# Patient Record
Sex: MALE | Race: WHITE | NOT HISPANIC OR LATINO | Employment: FULL TIME | URBAN - METROPOLITAN AREA
[De-identification: names, ages, dates, MRNs, and addresses within clinical notes are randomized per-mention and may not be internally consistent; named-entity substitution may affect disease eponyms.]

---

## 2017-08-23 ENCOUNTER — OFFICE VISIT (OUTPATIENT)
Dept: FAMILY MEDICINE | Facility: CLINIC | Age: 27
End: 2017-08-23
Payer: COMMERCIAL

## 2017-08-23 VITALS
HEART RATE: 100 BPM | BODY MASS INDEX: 31.5 KG/M2 | SYSTOLIC BLOOD PRESSURE: 138 MMHG | DIASTOLIC BLOOD PRESSURE: 96 MMHG | WEIGHT: 212.7 LBS | OXYGEN SATURATION: 97 % | HEIGHT: 69 IN | TEMPERATURE: 98.8 F

## 2017-08-23 DIAGNOSIS — F41.1 GAD (GENERALIZED ANXIETY DISORDER): Primary | ICD-10-CM

## 2017-08-23 PROCEDURE — 99203 OFFICE O/P NEW LOW 30 MIN: CPT | Performed by: NURSE PRACTITIONER

## 2017-08-23 RX ORDER — LORAZEPAM 1 MG/1
1 TABLET ORAL DAILY PRN
Qty: 20 TABLET | Refills: 0 | Status: SHIPPED | OUTPATIENT
Start: 2017-08-23 | End: 2017-11-01

## 2017-08-23 RX ORDER — ESCITALOPRAM OXALATE 20 MG/1
TABLET ORAL
Qty: 30 TABLET | Refills: 1 | Status: SHIPPED | OUTPATIENT
Start: 2017-08-23 | End: 2017-09-27

## 2017-08-23 ASSESSMENT — ANXIETY QUESTIONNAIRES
6. BECOMING EASILY ANNOYED OR IRRITABLE: SEVERAL DAYS
IF YOU CHECKED OFF ANY PROBLEMS ON THIS QUESTIONNAIRE, HOW DIFFICULT HAVE THESE PROBLEMS MADE IT FOR YOU TO DO YOUR WORK, TAKE CARE OF THINGS AT HOME, OR GET ALONG WITH OTHER PEOPLE: SOMEWHAT DIFFICULT
1. FEELING NERVOUS, ANXIOUS, OR ON EDGE: MORE THAN HALF THE DAYS
3. WORRYING TOO MUCH ABOUT DIFFERENT THINGS: MORE THAN HALF THE DAYS
5. BEING SO RESTLESS THAT IT IS HARD TO SIT STILL: SEVERAL DAYS
GAD7 TOTAL SCORE: 9
2. NOT BEING ABLE TO STOP OR CONTROL WORRYING: MORE THAN HALF THE DAYS
7. FEELING AFRAID AS IF SOMETHING AWFUL MIGHT HAPPEN: NOT AT ALL

## 2017-08-23 ASSESSMENT — PATIENT HEALTH QUESTIONNAIRE - PHQ9
5. POOR APPETITE OR OVEREATING: SEVERAL DAYS
SUM OF ALL RESPONSES TO PHQ QUESTIONS 1-9: 9

## 2017-08-23 NOTE — NURSING NOTE
"Chief Complaint   Patient presents with     Depression     Anxiety       Initial BP (!) 138/96  Pulse 100  Temp 98.8  F (37.1  C) (Oral)  Ht 5' 9.29\" (1.76 m)  Wt 212 lb 11.2 oz (96.5 kg)  SpO2 97%  BMI 31.15 kg/m2 Estimated body mass index is 31.15 kg/(m^2) as calculated from the following:    Height as of this encounter: 5' 9.29\" (1.76 m).    Weight as of this encounter: 212 lb 11.2 oz (96.5 kg).  Medication Reconciliation: complete     Jairon Parra MA    "

## 2017-08-23 NOTE — PATIENT INSTRUCTIONS
Plan for depression and anxiety :    - eat a balanced diet with lots of fruits, vegetables, protein, and whole grains   (organic best).  Try decreasing or avoiding sugars, trans fats, and white flour  -consider these supplements daily:    multiple vitamins or B complex with at least 50 mg of B6,    fish oil 1000 mg twice daily or 2 TBS ground flax seed meal    Vit D3 2520-8983 IU     Probiotics (healthy bacteria) especially if stomach or bowel symptoms   - exercise regularly with at least 30 minutes of movement daily or 5 hours per week  - sleep at least 8 hours per night    If having difficulty getting to sleep, try Magnesium glycinate 400-600 mg or    melatonin 1-3 mg in the evening    If having difficulty staying asleep, try L-theanine 100-200 mg at bedtime or   Passionflower tincture, tea or capsule  -practice meditation or relaxation daily   Yoga, Checo Chi, Qi Gong, sitting or walking meditation or   whatever you do that is meditative--that which empties the mind of worry   Sewing, reading, cooking, gardening, fishing, praying, etc  -consider starting or continuing in counseling or therapy   Contact your health insurance for resources  -seek support from friends and family and raul communities   -other alternative therapies may also be of help such as acupuncture, homeopathy,  traditional chinese medicine, massage, etc      In addition to the above important treatments, antidepressant meds may be prescribed.   Drugs of the SSRI class can have side effects such as weight gain, sexual dysfunction, insomnia, headache, nausea, and initially increases in anxiety.  Let me know if significant side effects do occur.    Limit Ativan to 1/2 tablet-1 tablet twice per week with eye on stopping altogether after the Lexapro gets to a stable dose.  We can also try propranolol.    Follow up in clinic in 4 weeks for recheck; sooner if symptoms should worsen.        Resources/Books:  Unstuck by Hany Gutierrez     The Chemistry of  Nubia  by Gaston Velazquez     The Chemistry of Calm  by Gaston Velazquez     Total Catastrophe Living by Andrew Mahoney    Mindfulness-Based Stress Reduction Classes:    Compassionate [facility]  www.[email or web address].org  [phone]    [facility]  www.[email or web address]/classes    [facility]  www.[email or web address]    [facility] [place]  www.[facility]

## 2017-08-23 NOTE — MR AVS SNAPSHOT
After Visit Summary   8/23/2017    Ambrosio Kemp    MRN: [id number]           Patient Information     Date Of Birth          1990        Visit Information        Provider Department      8/23/2017 1:30 PM Rin Mena APRN [facility]        Today's Diagnoses     TODD (generalized anxiety disorder)    -  1      Care Instructions    Plan for depression and anxiety :    - eat a balanced diet with lots of fruits, vegetables, protein, and whole grains   (organic best).  Try decreasing or avoiding sugars, trans fats, and white flour  -consider these supplements daily:    multiple vitamins or B complex with at least 50 mg of B6,    fish oil 1000 mg twice daily or 2 TBS ground flax seed meal    Vit D3 3502-8911 IU     Probiotics (healthy bacteria) especially if stomach or bowel symptoms   - exercise regularly with at least 30 minutes of movement daily or 5 hours per week  - sleep at least 8 hours per night    If having difficulty getting to sleep, try Magnesium glycinate 400-600 mg or    melatonin 1-3 mg in the evening    If having difficulty staying asleep, try L-theanine 100-200 mg at bedtime or   Passionflower tincture, tea or capsule  -practice meditation or relaxation daily   Yoga, Checo Chi, Qi Gong, sitting or walking meditation or   whatever you do that is meditative--that which empties the mind of worry   Sewing, reading, cooking, gardening, fishing, praying, etc  -consider starting or continuing in counseling or therapy   Contact your health insurance for resources  -seek support from friends and family and raul communities   -other alternative therapies may also be of help such as acupuncture, homeopathy,  traditional chinese medicine, massage, etc      In addition to the above important treatments, antidepressant meds may be prescribed.   Drugs of the SSRI class can have side effects such as weight gain, sexual dysfunction, insomnia, headache, nausea, and initially  increases in anxiety.  Let me know if significant side effects do occur.    Limit Ativan to 1/2 tablet-1 tablet twice per week with eye on stopping altogether after the Lexapro gets to a stable dose.  We can also try propranolol.    Follow up in clinic in 4 weeks for recheck; sooner if symptoms should worsen.        Resources/Books:  Unstuck by Hany Gutierrez     The Chemistry of Nubia  by Gaston Velazquez     The Chemistry of Calm  by Gaston Velazquez     Total Catastrophe Living by Andrew Mahoney    Mindfulness-Based Stress Reduction Classes:    Compassionate [facility]  www.[facility].org  [phone]    [facility]  www.[employer]/classes    [facility]  www.[email or web address]    [facility] [place]  www.[employer]              Follow-ups after your visit        Additional Services     MENTAL HEALTH REFERRAL       Your provider has referred you to: [facility]: [facility] - Counseling (Individual/Couples/Family) - [facility] [phone]   [email or web address]/[email or web address]/   *Patient will be contacted by [facility]'s scheduling partner, [facility]), to schedule an appointment.  Patients may also call [facility] to schedule.  [facility] [phone]   [email or web address]/[email or web address]/   *Patient will be contacted by [facility]'s scheduling partner, [facility]), to schedule an appointment.  Patients may also call P to schedule.    Carly Gershone - [facility] or Sugar Smith at [place];   Jay Theodore or Akanksha Wooten at [place];   Carie Bo  [facility]      All scheduling is subject to the client's specific insurance plan & benefits, provider/location availability, and provider clinical specialities.  Please arrive 15 minutes early for your first  "appointment and bring your completed paperwork.    Please be aware that coverage of these services is subject to the terms and limitations of your health insurance plan.  Call member services at your health plan with any benefit or coverage questions.                  Who to contact     If you have questions or need follow up information about today's clinic visit or your schedule please contact [facility] directly at [phone].  Normal or non-critical lab and imaging results will be communicated to you by MyChart, letter or phone within 4 business days after the clinic has received the results. If you do not hear from us within 7 days, please contact the clinic through [employer] or phone. If you have a critical or abnormal lab result, we will notify you by phone as soon as possible.  Submit refill requests through [employer] or call your pharmacy and they will forward the refill request to us. Please allow 3 business days for your refill to be completed.          Additional Information About Your Visit        [employer] Information     [employer] lets you send messages to your doctor, view your test results, renew your prescriptions, schedule appointments and more. To sign up, go to [email or web address] . Click on \"Log in\" on the left side of the screen, which will take you to the Welcome page. Then click on \"Sign up Now\" on the right side of the page.     You will be asked to enter the access code listed below, as well as some personal information. Please follow the directions to create your username and password.     Your access code is: [email or web address]  Expires: 2017  2:30 PM     Your access code will  in 90 days. If you need help or a new code, please call your [facility] or [phone].        Care EveryWhere ID     This is your Care EveryWhere ID. This could be used by other organizations to access your [place] medical records  [id number]        Your Vitals Were     Pulse " "Temperature Height Pulse Oximetry BMI (Body Mass Index)       100 98.8  F (37.1  C) (Oral) 5' 9.29\" (1.76 m) 97% 31.15 kg/m2        Blood Pressure from Last 3 Encounters:   08/23/17 (!) 138/96   09/21/13 140/78    Weight from Last 3 Encounters:   08/23/17 212 lb 11.2 oz (96.5 kg)   09/21/13 190 lb (86.2 kg)              We Performed the Following     MENTAL HEALTH REFERRAL          Today's Medication Changes          These changes are accurate as of: 8/23/17  2:30 PM.  If you have any questions, ask your nurse or doctor.               Start taking these medicines.        Dose/Directions    escitalopram 20 MG tablet   Commonly known as:  LEXAPRO   Used for:  TODD (generalized anxiety disorder)        Take 1/2 tablet (10 mg) for 1-2 weeks, then increase to 1 tablet orally daily   Quantity:  30 tablet   Refills:  1       LORazepam 1 MG tablet   Commonly known as:  ATIVAN   Used for:  TODD (generalized anxiety disorder)        Dose:  1 mg   Take 1 tablet (1 mg) by mouth daily as needed for anxiety   Quantity:  20 tablet   Refills:  0            Where to get your medicines      These medications were sent to [facility]/pharmacy #[id number] - [place], [place] - [address], [place] [address]     Phone:  [phone]     escitalopram 20 MG tablet         Some of these will need a paper prescription and others can be bought over the counter.  Ask your nurse if you have questions.     Bring a paper prescription for each of these medications     LORazepam 1 MG tablet                Primary Care Provider    None Doctor, MD       No address on file        Equal Access to Services     [facility] AH: Hadii ino diazo Soaudra, waaxda luqadaha, qaybta kaalmada adeegyada, curtis krueger. So [facility] [phone].    ATENCIÓN: Si habla español, tiene a galvan disposición servicios gratuitos de asistencia lingüística. Llame al [phone].    We comply with applicable federal civil rights " laws and [place] laws. We do not discriminate on the basis of race, color, national origin, age, disability sex, sexual orientation or gender identity.            Thank you!     Thank you for choosing [facility]  for your care. Our goal is always to provide you with excellent care. Hearing back from our patients is one way we can continue to improve our services. Please take a few minutes to complete the written survey that you may receive in the mail after your visit with us. Thank you!             Your Updated Medication List - Protect others around you: Learn how to safely use, store and throw away your medicines at www.disposemymeds.org.          This list is accurate as of: 8/23/17  2:30 PM.  Always use your most recent med list.                   Brand Name Dispense Instructions for use Diagnosis    escitalopram 20 MG tablet    LEXAPRO    30 tablet    Take 1/2 tablet (10 mg) for 1-2 weeks, then increase to 1 tablet orally daily    TODD (generalized anxiety disorder)       LORazepam 1 MG tablet    ATIVAN    20 tablet    Take 1 tablet (1 mg) by mouth daily as needed for anxiety    TODD (generalized anxiety disorder)

## 2017-08-23 NOTE — PROGRESS NOTES
SUBJECTIVE:   Ambrosio Kemp is a 26 year old male who presents to clinic today for the following health issues:      Abnormal Mood Symptoms  Onset: 8-9  Diagnosed (date):  8 years ago when 18 years old  Current medication: None since eight years  Previous medications: Lexapro and Zoloft, tried many SSRIs in high school; many side effects, Lexapro   Therapy in the past, potentially interested  Has had concerns about hypomania more in the past  Most recently SI 10 months    Description:   Depression: YES - less predominant  Anxiety: YES - racing thoughts, hard tor describe, easily overwhelmed when in a car, starting to be in other situations - can't breathe, crying, stops functioning.  Most recently occurred at boyfriend's graduation    Accompanying Signs & Symptoms:     Still participating in activities that you used to enjoy: YES  Fatigue: YES  Irritability: no   Difficulty concentrating: YES- sometimes  Changes in appetite: YES  Problems with sleep: no   Heart racing/beating fast : no   Thoughts of hurting yourself or others: none    History:   Recent stress: YES  Prior depression hospitalization: None  Family history of depression: YES  Family history of anxiety: YES    Precipitating factors:   Alcohol/drug use: no     Alleviating factors:  None  Therapies Tried and outcome: None    From [place]  Studied psychology at the [employer]  Working at [employer] - ready to move on to something in his field  New apartment, new relationship  Exercise four times a week - mostly lifting, some cardio - more recently started    Problem list and histories reviewed & adjusted, as indicated.  Additional history: as documented    Reviewed and updated as needed this [facility]  Meds       Reviewed and updated as needed this visit by Provider       ROS:  Constitutional, HEENT, cardiovascular, pulmonary, gi and gu systems are negative, except as otherwise noted.      OBJECTIVE:   BP (!) 138/96  Pulse 100  Temp  "98.8  F (37.1  C) (Oral)  Ht 5' 9.29\" (1.76 m)  Wt 212 lb 11.2 oz (96.5 kg)  SpO2 97%  BMI 31.15 kg/m2  Body mass index is 31.15 kg/(m^2).  GENERAL: healthy, alert and no distress  EYES: Eyes grossly normal to inspection, PERRL and conjunctivae and sclerae normal  HENT: ear canals and TM's normal, nose and mouth without ulcers or lesions  NECK: no adenopathy, no asymmetry, masses, or scars and thyroid normal to palpation  RESP: lungs clear to auscultation - no rales, rhonchi or wheezes  CV: regular rate and rhythm, normal S1 S2, no S3 or S4, no murmur, click or rub, no peripheral edema and peripheral pulses strong  ABDOMEN: soft, nontender, no hepatosplenomegaly, no masses and bowel sounds normal  MS: no gross musculoskeletal defects noted, no edema  SKIN: no suspicious lesions or rashes  NEURO: Normal strength and tone, mentation intact and speech normal  PSYCH: mentation appears normal, affect normal/bright  LYMPH: no cervical, supraclavicular, axillary, or inguinal adenopathy    Diagnostic Test Results:  none   Please note greater than 50% of this 30 minute appointment were spent in counseling with the patient of the issues described above in the history of present illness and in the plan, including anxiety treatment options including medication, counseling, exercise, diet changes, meditation and supportive community, as well as side effects of medications.  ASSESSMENT/PLAN:       (F41.1) TODD (generalized anxiety disorder)  (primary encounter diagnosis)  Comment:   Plan: escitalopram (LEXAPRO) 20 MG tablet, MENTAL         HEALTH REFERRAL, LORazepam (ATIVAN) 1 MG tablet              Patient Instructions   Plan for depression and anxiety :    - eat a balanced diet with lots of fruits, vegetables, protein, and whole grains   (organic best).  Try decreasing or avoiding sugars, trans fats, and white flour  -consider these supplements daily:    multiple vitamins or B complex with at least 50 mg of B6,    fish oil " 1000 mg twice daily or 2 TBS ground flax seed meal    Vit D3 1180-6807 IU     Probiotics (healthy bacteria) especially if stomach or bowel symptoms   - exercise regularly with at least 30 minutes of movement daily or 5 hours per week  - sleep at least 8 hours per night    If having difficulty getting to sleep, try Magnesium glycinate 400-600 mg or    melatonin 1-3 mg in the evening    If having difficulty staying asleep, try L-theanine 100-200 mg at bedtime or   Passionflower tincture, tea or capsule  -practice meditation or relaxation daily   Yoga, Checo Chi, Qi Gong, sitting or walking meditation or   whatever you do that is meditative--that which empties the mind of worry   Sewing, reading, cooking, gardening, fishing, praying, etc  -consider starting or continuing in counseling or therapy   Contact your health insurance for resources  -seek support from friends and family and raul communities   -other alternative therapies may also be of help such as acupuncture, homeopathy,  traditional chinese medicine, massage, etc      In addition to the above important treatments, antidepressant meds may be prescribed.   Drugs of the SSRI class can have side effects such as weight gain, sexual dysfunction, insomnia, headache, nausea, and initially increases in anxiety.  Let me know if significant side effects do occur.    Limit Ativan to 1/2 tablet-1 tablet twice per week with eye on stopping altogether after the Lexapro gets to a stable dose.  We can also try propranolol.    Follow up in clinic in 4 weeks for recheck; sooner if symptoms should worsen.        Resources/Books:  Unstuck by Hany Gutierrez     The Chemistry of Nubia  by Gaston Velazquez     The Chemistry of Calm  by Gaston Velazquez     Total Catastrophe Living by Andrew Mahoney    Mindfulness-Based Stress Reduction Classes:    Compassionate [facility]  www.[facility].org  [phone]    [facility]  www.[employer]/classes    Common  [facility]  www.[facility].org    [facility] [place]  www.[facility].[employer]          [facility]

## 2017-08-24 ASSESSMENT — ANXIETY QUESTIONNAIRES: GAD7 TOTAL SCORE: 9

## 2017-09-27 ENCOUNTER — OFFICE VISIT (OUTPATIENT)
Dept: FAMILY MEDICINE | Facility: CLINIC | Age: 27
End: 2017-09-27
Payer: COMMERCIAL

## 2017-09-27 VITALS
HEART RATE: 72 BPM | SYSTOLIC BLOOD PRESSURE: 140 MMHG | OXYGEN SATURATION: 97 % | WEIGHT: 216.5 LBS | DIASTOLIC BLOOD PRESSURE: 78 MMHG | BODY MASS INDEX: 31.7 KG/M2 | TEMPERATURE: 98.6 F

## 2017-09-27 DIAGNOSIS — F41.1 GAD (GENERALIZED ANXIETY DISORDER): ICD-10-CM

## 2017-09-27 PROCEDURE — 99213 OFFICE O/P EST LOW 20 MIN: CPT | Performed by: NURSE PRACTITIONER

## 2017-09-27 RX ORDER — ESCITALOPRAM OXALATE 20 MG/1
TABLET ORAL
Qty: 90 TABLET | Refills: 1 | Status: SHIPPED | OUTPATIENT
Start: 2017-09-27 | End: 2018-01-03

## 2017-09-27 ASSESSMENT — ANXIETY QUESTIONNAIRES
7. FEELING AFRAID AS IF SOMETHING AWFUL MIGHT HAPPEN: NOT AT ALL
1. FEELING NERVOUS, ANXIOUS, OR ON EDGE: SEVERAL DAYS
2. NOT BEING ABLE TO STOP OR CONTROL WORRYING: SEVERAL DAYS
IF YOU CHECKED OFF ANY PROBLEMS ON THIS QUESTIONNAIRE, HOW DIFFICULT HAVE THESE PROBLEMS MADE IT FOR YOU TO DO YOUR WORK, TAKE CARE OF THINGS AT HOME, OR GET ALONG WITH OTHER PEOPLE: SOMEWHAT DIFFICULT
3. WORRYING TOO MUCH ABOUT DIFFERENT THINGS: SEVERAL DAYS
5. BEING SO RESTLESS THAT IT IS HARD TO SIT STILL: SEVERAL DAYS
GAD7 TOTAL SCORE: 6
6. BECOMING EASILY ANNOYED OR IRRITABLE: SEVERAL DAYS

## 2017-09-27 ASSESSMENT — PATIENT HEALTH QUESTIONNAIRE - PHQ9
5. POOR APPETITE OR OVEREATING: SEVERAL DAYS
SUM OF ALL RESPONSES TO PHQ QUESTIONS 1-9: 7

## 2017-09-27 NOTE — MR AVS SNAPSHOT
After Visit Summary   9/27/2017    Ambrosio Kemp    MRN: [id number]           Patient Information     Date Of Birth          1990        Visit Information        Provider Department      9/27/2017 3:45 PM Rin Mena APRN CNP [facility]        Today's Diagnoses     TODD (generalized anxiety disorder)          Care Instructions    Goal for ativan once per week or less  Still consider therapy  Include a meditation or mindfulness practice  Return in 6 month or sooner if you want to make changes or things are not going well          Follow-ups after your visit        Your next 10 appointments already scheduled     Sep 27, 2017  3:45 PM CDT   Office Visit with JAC Mckinney CNP   [facility] ([facility])    [address] [place] [id number] [phone]           Bring a current list of meds and any records pertaining to this visit. For Physicals, please bring immunization records and any forms needing to be filled out. Please arrive 10 minutes early to complete paperwork.              Who to contact     If you have questions or need follow up information about today's clinic visit or your schedule please contact [facility] directly at [phone].  Normal or non-critical lab and imaging results will be communicated to you by MyChart, letter or phone within 4 business days after the clinic has received the results. If you do not hear from us within 7 days, please contact the clinic through [employer] or phone. If you have a critical or abnormal lab result, we will notify you by phone as soon as possible.  Submit refill requests through [employer] or call your pharmacy and they will forward the refill request to us. Please allow 3 business days for your refill to be completed.          Additional Information About Your Visit        [employer] Information     [employer] gives you secure access to your electronic  health record. If you see a primary care provider, you can also send messages to your care team and make appointments. If you have questions, please call your primary care clinic.  If you do not have a primary care provider, please call [phone] and they will assist you.        Care EveryWhere ID     This is your Care EveryWhere ID. This could be used by other organizations to access your [place] medical records  [id number]        Your Vitals Were     Pulse Temperature Pulse Oximetry BMI (Body Mass Index)          72 98.6  F (37  C) (Oral) 97% 31.7 kg/m2         Blood Pressure from Last 3 Encounters:   09/27/17 140/78   08/23/17 (!) 138/96   09/21/13 140/78    Weight from Last 3 Encounters:   09/27/17 216 lb 8 oz (98.2 kg)   08/23/17 212 lb 11.2 oz (96.5 kg)   09/21/13 190 lb (86.2 kg)              Today, you had the following     No orders found for display         Today's Medication Changes          These changes are accurate as of: 9/27/17  3:27 PM.  If you have any questions, ask your nurse or doctor.               These medicines have changed or have updated prescriptions.        Dose/Directions    escitalopram 20 MG tablet   Commonly known as:  LEXAPRO   This may have changed:  additional instructions   Used for:  TODD (generalized anxiety disorder)   Changed by:  Rin Mena, JAC CNP        1 tablet orally daily   Quantity:  90 tablet   Refills:  1            Where to get your medicines      These medications were sent to [facility]/pharmacy #[id number] - [place], [place] - [address], [place] [address]     Phone:  [phone]     escitalopram 20 MG tablet                Primary Care Provider    None Specified       No primary provider on file.        Equal Access to Services     [facility] : parker Ruiz qaybta kaalmada adeegyada, waxay idiin hayaan adeeg kharash la'aan ah. So [facility] [phone].    ATENCIÓN: pranay Singh galvan  disposición servicios gratuitos de asistencia lingüística. Shraddha zazueta [phone].    We comply with applicable federal civil rights laws and [place] laws. We do not discriminate on the basis of race, color, national origin, age, disability sex, sexual orientation or gender identity.            Thank you!     Thank you for choosing [facility]  for your care. Our goal is always to provide you with excellent care. Hearing back from our patients is one way we can continue to improve our services. Please take a few minutes to complete the written survey that you may receive in the mail after your visit with us. Thank you!             Your Updated Medication List - Protect others around you: Learn how to safely use, store and throw away your medicines at www.disposemymeds.org.          This list is accurate as of: 9/27/17  3:27 PM.  Always use your most recent med list.                   Brand Name Dispense Instructions for use Diagnosis    escitalopram 20 MG tablet    LEXAPRO    90 tablet    1 tablet orally daily    TODD (generalized anxiety disorder)       LORazepam 1 MG tablet    ATIVAN    20 tablet    Take 1 tablet (1 mg) by mouth daily as needed for anxiety    TODD (generalized anxiety disorder)

## 2017-09-27 NOTE — NURSING NOTE
"Chief Complaint   Patient presents with     Depression     Anxiety       Initial /78  Pulse 72  Temp 98.6  F (37  C) (Oral)  Wt 216 lb 8 oz (98.2 kg)  SpO2 97%  BMI 31.7 kg/m2 Estimated body mass index is 31.7 kg/(m^2) as calculated from the following:    Height as of 8/23/17: 5' 9.29\" (1.76 m).    Weight as of this encounter: 216 lb 8 oz (98.2 kg).  Medication Reconciliation: complete     Jairon Parra MA    "

## 2017-09-27 NOTE — PATIENT INSTRUCTIONS
Goal for ativan once per week or less  Still consider therapy  Include a meditation or mindfulness practice  Return in 6 month or sooner if you want to make changes or things are not going well

## 2017-09-27 NOTE — PROGRESS NOTES
SUBJECTIVE:   Ambrosio Kemp is a 26 year old male who presents to clinic today for the following health issues:    Depression and Anxiety Follow-Up    Status since last visit: Improved     Other associated symptoms: Restlessness - with initiation and dose increase, but this has settled    Used ativan some, but still has some left - approximately    Complicating factors:     Significant life event: No     Current substance abuse: None    PHQ-9 SCORE 8/23/2017   Total Score 9     TODD-7 SCORE 8/23/2017   Total Score 9       PHQ-9  English  PHQ-9   Any Language  GAD7      Amount of exercise or physical activity: 4-5 days/week for an average of 45-60 minutes    Problems taking medications regularly: No    Medication side effects: none    Diet: regular (no restrictions)    Problem list and histories reviewed & adjusted, as indicated.  Additional history: as documented    Reviewed and updated as needed this visit by clinical staff     Reviewed and updated as needed this visit by Provider         ROS:  Constitutional, HEENT, cardiovascular, pulmonary, gi and gu systems are negative, except as otherwise noted.      OBJECTIVE:   /78  Pulse 72  Temp 98.6  F (37  C) (Oral)  Wt 216 lb 8 oz (98.2 kg)  SpO2 97%  BMI 31.7 kg/m2  Body mass index is 31.7 kg/(m^2).  GENERAL: healthy, alert and no distress  NECK: no adenopathy, no asymmetry, masses, or scars and thyroid normal to palpation  RESP: lungs clear to auscultation - no rales, rhonchi or wheezes  CV: regular rate and rhythm, normal S1 S2, no S3 or S4, no murmur, click or rub, no peripheral edema and peripheral pulses strong  ABDOMEN: soft, nontender, no hepatosplenomegaly, no masses and bowel sounds normal  MS: no gross musculoskeletal defects noted, no edema  SKIN: no suspicious lesions or rashes  NEURO: Normal strength and tone, mentation intact and speech normal  PSYCH: mentation appears normal, affect normal/bright    Diagnostic Test Results:  none      ASSESSMENT/PLAN:       (F41.1) TODD (generalized anxiety disorder)  Comment:   Plan: escitalopram (LEXAPRO) 20 MG tablet              See Patient Instructions    JAC Javed [facility]    Tdap 3/11/14

## 2017-09-28 ASSESSMENT — ANXIETY QUESTIONNAIRES: GAD7 TOTAL SCORE: 6

## 2017-11-01 ENCOUNTER — OFFICE VISIT (OUTPATIENT)
Dept: BEHAVIORAL HEALTH | Facility: CLINIC | Age: 27
End: 2017-11-01
Payer: COMMERCIAL

## 2017-11-01 ENCOUNTER — DOCUMENTATION ONLY (OUTPATIENT)
Dept: PHARMACY | Facility: CLINIC | Age: 27
End: 2017-11-01

## 2017-11-01 ENCOUNTER — OFFICE VISIT (OUTPATIENT)
Dept: FAMILY MEDICINE | Facility: CLINIC | Age: 27
End: 2017-11-01
Payer: COMMERCIAL

## 2017-11-01 VITALS
OXYGEN SATURATION: 96 % | HEART RATE: 79 BPM | BODY MASS INDEX: 31.84 KG/M2 | DIASTOLIC BLOOD PRESSURE: 80 MMHG | WEIGHT: 217.4 LBS | TEMPERATURE: 99.3 F | SYSTOLIC BLOOD PRESSURE: 138 MMHG

## 2017-11-01 DIAGNOSIS — F31.81 BIPOLAR 2 DISORDER (H): Primary | ICD-10-CM

## 2017-11-01 DIAGNOSIS — T50.905A ADVERSE EFFECT OF DRUG, INITIAL ENCOUNTER: ICD-10-CM

## 2017-11-01 DIAGNOSIS — F41.1 GAD (GENERALIZED ANXIETY DISORDER): Primary | ICD-10-CM

## 2017-11-01 DIAGNOSIS — F39 MOOD DISORDER (H): ICD-10-CM

## 2017-11-01 DIAGNOSIS — F33.1 MODERATE EPISODE OF RECURRENT MAJOR DEPRESSIVE DISORDER (H): ICD-10-CM

## 2017-11-01 PROCEDURE — 99215 OFFICE O/P EST HI 40 MIN: CPT | Performed by: NURSE PRACTITIONER

## 2017-11-01 PROCEDURE — 90832 PSYTX W PT 30 MINUTES: CPT | Performed by: SOCIAL WORKER

## 2017-11-01 RX ORDER — LAMOTRIGINE 25 MG/1
25 TABLET ORAL DAILY
Qty: 45 TABLET | Refills: 0 | Status: SHIPPED | OUTPATIENT
Start: 2017-11-01 | End: 2018-01-03

## 2017-11-01 RX ORDER — LORAZEPAM 1 MG/1
1 TABLET ORAL DAILY PRN
Qty: 20 TABLET | Refills: 0 | Status: SHIPPED | OUTPATIENT
Start: 2017-11-01 | End: 2018-08-01

## 2017-11-01 NOTE — PROGRESS NOTES
SUBJECTIVE:   Ambrosio Kemp is a 27 year old male who presents to clinic today for the following health issues:      Anxiety Follow-Up    Status since last visit: No change    Medication side effects include: depression, getting worse, suicidal thoughts for past 1 1/2 weeks, cut arm for release, low self-esteem, uncontrollable crying; used to have sexual side effects which have gotten better, no other physical symptoms    Break up with boyfriend two days ago on birthday    Keeping self surrounded by support and keeping busy    Having concerns about possible bipolar diagnosis - some manic type episodes - example spending $150 on clothes impulsively    Controlled symptoms include: anxiety    Uncontrolled symptoms include: depression    Other associated symptoms:None    Complicating factors:   Significant life event: No   Current substance abuse: None  Depression symptoms: Yes-  After taking the medication.   TODD-7 SCORE 8/23/2017 9/27/2017   Total Score 9 6         Amount of exercise or physical activity: 4-5 days/week for an average of 45-60 minutes    Problems taking medications regularly: No    Medication side effects: none    Diet: regular (no restrictions) and vegetarian/vegan    Problem list and histories reviewed & adjusted, as indicated.  Additional history: as documented    Reviewed and updated as needed this visit by clinical staffTobacco  Allergies  Meds  Med Hx  Surg Hx  Fam Hx  Soc Hx      Reviewed and updated as needed this visit by Provider         ROS:  Constitutional, HEENT, cardiovascular, pulmonary, gi and gu systems are negative, except as otherwise noted.      OBJECTIVE:   /80  Pulse 79  Temp 99.3  F (37.4  C) (Oral)  Wt 217 lb 6.4 oz (98.6 kg)  SpO2 96%  BMI 31.84 kg/m2  Body mass index is 31.84 kg/(m^2).  GENERAL: healthy, alert and no distress  RESP: lungs clear to auscultation - no rales, rhonchi or wheezes  CV: regular rate and rhythm, normal S1 S2, no S3 or S4, no murmur,  click or rub, no peripheral edema and peripheral pulses strong  MENTAL STATUS EXAM  Appearance: appropriate  Attitude: cooperative  Behavior: normal  Eye Contact: normal  Speech: normal  Orientation: oriented to person , place, time and situation  Mood:  Sadness, apathy most of time  Affect: anxious  Thought Process: clear  Suicidal Ideation: reports thoughts, no intention, cutting behavior with no intention of death  Hallucination: no      Diagnostic Test Results:  none     ASSESSMENT/PLAN:     Depression; recurrent episode-- Severe   Associated with the following complications:    TODD, Mood disorder   Plan:  Medications:   Mood stabilizer - Lamictal  Stop Lexapro  Cut back on Ativan  Referrals/Other:  Encourage regular exercise, Counseling recommended, Psychiatry consult, No harm contract discussed and [facility] consultation, [facility] consultation        (F41.1) TODD (generalized anxiety disorder)  (primary encounter diagnosis)  Comment:   Plan: MENTAL HEALTH REFERRAL, LORazepam (ATIVAN) 1 MG        tablet   Med side effects with Lexapro - worsening depression, cycling of probable bipolar 2.  Discontinue Lexapro and start Lamictal.  Schedule with psych for definitive diagnosis.  Closer than typical follow-up here.    (F33.1) Moderate episode of recurrent major depressive disorder (H)  Comment:   Plan: MENTAL HEALTH REFERRAL            (F39) Mood disorder (H)  Comment:   Plan: After report of impulsive behaviors and concerns for bipolar, I had [facility] consultation for eval of possible mood disorder.  She did confirm strong suspicions for mood disorder.  I did place a psychiatry referral, but with access and severity of symptoms I felt we needed to make med changes today including a mood stabilizer.  Consultation with [facility] today to discuss options and patient decided on Lamictal which was ordered by [facility].  He will follow-up in 2-4 weeks.  Able to contract for safety.      (T88.7XXA) Adverse effect of drug, initial encounter  Comment:    Plan: SSRI provoking depression and/or bipolar cycling        See Patient Instructions    JAC Javed [facility]

## 2017-11-01 NOTE — NURSING NOTE
"No chief complaint on file.      Initial /84  Pulse 79  Temp 99.3  F (37.4  C) (Oral)  Wt 217 lb 6.4 oz (98.6 kg)  SpO2 96%  BMI 31.84 kg/m2 Estimated body mass index is 31.84 kg/(m^2) as calculated from the following:    Height as of 8/23/17: 5' 9.29\" (1.76 m).    Weight as of this encounter: 217 lb 6.4 oz (98.6 kg).  Medication Reconciliation: complete     Jairon Parra MA      "

## 2017-11-01 NOTE — MR AVS SNAPSHOT
After Visit Summary   11/1/2017    Ambrosio Kemp    MRN: [id number]           Patient Information     Date Of Birth          1990        Visit Information        Provider Department      11/1/2017 11:30 AM Korina Meyers LICSW [facility]        Today's Diagnoses     Bipolar 2 disorder (H)    -  1       Follow-ups after your visit        Who to contact     If you have questions or need follow up information about today's clinic visit or your schedule please contact [facility] directly at [phone].  Normal or non-critical lab and imaging results will be communicated to you by Selatrahart, letter or phone within 4 business days after the clinic has received the results. If you do not hear from us within 7 days, please contact the clinic through [employer] or phone. If you have a critical or abnormal lab result, we will notify you by phone as soon as possible.  Submit refill requests through [employer] or call your pharmacy and they will forward the refill request to us. Please allow 3 business days for your refill to be completed.          Additional Information About Your Visit        MyChart Information     [employer] gives you secure access to your electronic health record. If you see a primary care provider, you can also send messages to your care team and make appointments. If you have questions, please call your primary care clinic.  If you do not have a primary care provider, please call [phone] and they will assist you.        Care EveryWhere ID     This is your Care EveryWhere ID. This could be used by other organizations to access your [facility] medical records  [id number]         Blood Pressure from Last 3 Encounters:   11/01/17 138/80   09/27/17 140/78   08/23/17 (!) 138/96    Weight from Last 3 Encounters:   11/01/17 217 lb 6.4 oz (98.6 kg)   09/27/17 216 lb 8 oz (98.2 kg)   08/23/17 212 lb 11.2 oz (96.5 kg)               Today, you had the following     No orders found for display         Today's Medication Changes          These changes are accurate as of: 11/1/17 11:59 PM.  If you have any questions, ask your nurse or doctor.               Start taking these medicines.        Dose/Directions    lamoTRIgine 25 MG tablet   Commonly known as:  LaMICtal   Used for:  Bipolar 2 disorder (H)   Started by:  Annamaria Garcia        Dose:  25 mg   Take 1 tablet (25 mg) by mouth daily For two weeks, then increase to 50mg daily   Quantity:  45 tablet   Refills:  0            Where to get your medicines      These medications were sent to [facility]/pharmacy #[id number] - [place], [place] - [address], [place] [address]     Phone:  [phone]     lamoTRIgine 25 MG tablet         Some of these will need a paper prescription and others can be bought over the counter.  Ask your nurse if you have questions.     Bring a paper prescription for each of these medications     LORazepam 1 MG tablet                Primary Care Provider Office Phone # Fax #    JAC Mckinney [facility] [phone]       [facility] [address] [facility] [address]  [facility] [address]        Equal Access to Services     MILDRED RAMÍREZ AH: Hadii aad ku hadasho Soomaali, waaxda luqadaha, qaybta kaalmada adeegyada, curtis ennisin hayaryan lucina pulido ah. So [facility] [phone].    ATENCIÓN: Si habla español, tiene a galvan disposición servicios gratuitos de asistencia lingüística. Toaname al [phone].    We comply with applicable federal civil rights laws and [place] laws. We do not discriminate on the basis of race, color, national origin, age, disability, sex, sexual orientation, or gender identity.            Thank you!     Thank you for choosing [facility]  for your care. Our goal is always to provide you with excellent care. Hearing back from our patients is one way we can continue to improve our  services. Please take a few minutes to complete the written survey that you may receive in the mail after your visit with us. Thank you!             Your Updated Medication List - Protect others around you: Learn how to safely use, store and throw away your medicines at www.disposemymeds.org.          This list is accurate as of: 11/1/17 11:59 PM.  Always use your most recent med list.                   Brand Name Dispense Instructions for use Diagnosis    escitalopram 20 MG tablet    LEXAPRO    90 tablet    1 tablet orally daily    TODD (generalized anxiety disorder)       lamoTRIgine 25 MG tablet    LaMICtal    45 tablet    Take 1 tablet (25 mg) by mouth daily For two weeks, then increase to 50mg daily    Bipolar 2 disorder (H)       LORazepam 1 MG tablet    ATIVAN    20 tablet    Take 1 tablet (1 mg) by mouth daily as needed for anxiety    TODD (generalized anxiety disorder)

## 2017-11-01 NOTE — PATIENT INSTRUCTIONS
Limit ativan use to once per week - so the 20 day supply should last almost 6 months.  If you are taking 1/2 tablet, then it should last 40 weeks  I expect symptoms to improve as we change medications around and get better coverage for mood disorder    Schedule with psychiatry - take whatever date they have and we will continue to work on medications until that appointment.

## 2017-11-01 NOTE — MR AVS SNAPSHOT
After Visit Summary   11/1/2017    Ambrosio Kemp    MRN: [id number]           Patient Information     Date Of Birth          1990        Visit Information        Provider Department      11/1/2017 10:45 AM Rin Mena APRN CNP [facility]        Today's Diagnoses     TODD (generalized anxiety disorder)    -  1    Moderate episode of recurrent major depressive disorder (H)        Mood disorder (H)          Care Instructions    Limit ativan use to once per week - so the 20 day supply should last almost 6 months.  If you are taking 1/2 tablet, then it should last 40 weeks  I expect symptoms to improve as we change medications around and get better coverage for mood disorder    Schedule with psychiatry - take whatever date they have and we will continue to work on medications until that appointment.          Follow-ups after your visit        Additional Services     MENTAL HEALTH REFERRAL       Your provider has referred you to: [facility]: Psychiatry Clinic [facility] [phone]   [email or web address]    All scheduling is subject to the client's specific insurance plan & benefits, provider/location availability, and provider clinical specialities.  Please arrive 15 minutes early for your first appointment and bring your completed paperwork.    Please be aware that coverage of these services is subject to the terms and limitations of your health insurance plan.  Call member services at your health plan with any benefit or coverage questions.                  Who to contact     If you have questions or need follow up information about today's clinic visit or your schedule please contact [facility] directly at [phone].  Normal or non-critical lab and imaging results will be communicated to you by MyChart, letter or phone within 4 business days after the clinic has received the results. If you do not hear from us within 7 days,  please contact the clinic through [employer] or phone. If you have a critical or abnormal lab result, we will notify you by phone as soon as possible.  Submit refill requests through [employer] or call your pharmacy and they will forward the refill request to us. Please allow 3 business days for your refill to be completed.          Additional Information About Your Visit        ParkingCarmaharElias Borges Urzeda Information     [employer] gives you secure access to your electronic health record. If you see a primary care provider, you can also send messages to your care team and make appointments. If you have questions, please call your primary care clinic.  If you do not have a primary care provider, please call [phone] and they will assist you.        Care EveryWhere ID     This is your Care EveryWhere ID. This could be used by other organizations to access your [place] medical records  [id number]        Your Vitals Were     Pulse Temperature Pulse Oximetry BMI (Body Mass Index)          79 99.3  F (37.4  C) (Oral) 96% 31.84 kg/m2         Blood Pressure from Last 3 Encounters:   11/01/17 138/80   09/27/17 140/78   08/23/17 (!) 138/96    Weight from Last 3 Encounters:   11/01/17 217 lb 6.4 oz (98.6 kg)   09/27/17 216 lb 8 oz (98.2 kg)   08/23/17 212 lb 11.2 oz (96.5 kg)              We Performed the Following     MENTAL HEALTH REFERRAL          Where to get your medicines      Some of these will need a paper prescription and others can be bought over the counter.  Ask your nurse if you have questions.     Bring a paper prescription for each of these medications     LORazepam 1 MG tablet          Primary Care Provider Office Phone # Fax #    JAC Mckinney [facility] [phone]       [facility] [address] [facility] [address]  [facility] [address]        Equal Access to Services     MARTY RAMÍREZ : Miguel Patel, parker rush, curtis narayanan. So [facility]  [phone].    ATENCIÓN: Si lulu liz, tiene a galvan disposición servicios gratuitos de asistencia lingüística. Shraddha zazueta [phone].    We comply with applicable federal civil rights laws and [place] laws. We do not discriminate on the basis of race, color, national origin, age, disability, sex, sexual orientation, or gender identity.            Thank you!     Thank you for choosing [facility]  for your care. Our goal is always to provide you with excellent care. Hearing back from our patients is one way we can continue to improve our services. Please take a few minutes to complete the written survey that you may receive in the mail after your visit with us. Thank you!             Your Updated Medication List - Protect others around you: Learn how to safely use, store and throw away your medicines at www.disposemymeds.org.          This list is accurate as of: 11/1/17 12:07 PM.  Always use your most recent med list.                   Brand Name Dispense Instructions for use Diagnosis    escitalopram 20 MG tablet    LEXAPRO    90 tablet    1 tablet orally daily    TODD (generalized anxiety disorder)       LORazepam 1 MG tablet    ATIVAN    20 tablet    Take 1 tablet (1 mg) by mouth daily as needed for anxiety    TODD (generalized anxiety disorder)

## 2017-11-01 NOTE — PROGRESS NOTES
Clinical Consult:                                                    Ambrosio Kemp is a 27 year old male coming in for a clinical pharmacist consult.  He was referred to me from Venessa Mena.     Reason for Consult: Recently diagnosed bipolar type 2    Discussion: Pt diagnosed with bipolar 2 disorder today. Brought in by PCP to discuss options for bipolar. After discussion of options, pt would like to start to start Lamictal. He knows a friend that was on lamictal and he feels comfortable with it.     Plan:  1. Start Lamictal 25mg daily for 2 weeks, then increase to 50mg daily. Rx sent with verbal auth from PCP.

## 2017-11-02 ENCOUNTER — MYC MEDICAL ADVICE (OUTPATIENT)
Dept: FAMILY MEDICINE | Facility: CLINIC | Age: 27
End: 2017-11-02

## 2017-11-02 PROBLEM — F31.81 BIPOLAR 2 DISORDER (H): Status: ACTIVE | Noted: 2017-11-02

## 2017-11-02 NOTE — PROGRESS NOTES
"[facility]  November 1, 2017      Behavioral Health Clinician Progress Note    Patient Name: Ambrosio Kemp           Service Type:  Individual      Service Location:   Face to Face in Clinic     Session Start Time: 11:32am  Session End Time: 11:59am      Session Length: 16 - 37      Attendees: Patient    Visit Activities (Refresh list every visit): C Only    Diagnostic Assessment Date: TBD  Treatment Plan Review Date: TBD  See Flowsheets for today's PHQ-9 and TODD-7 results  Previous PHQ-9:   PHQ-9 SCORE 8/23/2017 9/27/2017   Total Score 9 7     Previous TODD-7:   TODD-7 SCORE 8/23/2017 9/27/2017   Total Score 9 6       ANDREW LEVEL:  No flowsheet data found.    DATA  Extended Session (60+ minutes): No  Interactive Complexity: No  Crisis: No  [facility] Patient: No    Treatment Objective(s) Addressed in This Session:  Target Behavior(s): disease management/lifestyle changes stablize mood    Mood Instability: will develop better understanding of triggers and coping strategies to stabilize mood    Current Stressors / Issues:  Pt's PCP wanted writer to assess pt's mental health as he noticed an increase in both depression and hypomanic symptoms after starting an SSRI. Pt reported that he has felt this past week \"like I am doing things out of my character\". Writer asked pt to identify these things and pt stated that he has been more impulsive, spending more money than he should, has more energy at times and also pressured speech at times. Pt stated that he also has days where his depression is also much worse. Pt denied any hallucinations or delusions. He also denied staying up for several days or any other extreme manic symptoms. Pt and writer processed how his mood seems to be much more unstable since starting the medication and he has noticed a lot more ups and downs than in the past. Pt stated that he has noticed times in the past where he has experienced hypomanic symptoms for periods of time. Pt reported that " "his unstable mood has caused more difficulty at work because he will often feel like crying at work and then \"I will take a bunch of Ativan to get through it\". Writer discussed the dangers in this and how Ativan should be used very minimally. Pt seemed to understand this. Writer taught pt some grounding exercises to help him during times of mood instability so that he has some sense of control in regards to his mood. Writer than discussed with his PCP a likely bipolar 2 diagnosis as it appears that pt is experiencing times of depression and hypomania at a somewhat rapid cycling pace.    Reviewed new and ongoing stressors  Reviewed mental health symptoms and appropriate coping mechanisms    I affirmed the steps this patient has taken to address physical and behavioral health issues, and offered continued behavioral health services or referral, now or in the future, as needed by the patient.        Progress on Treatment Objective(s) / Homework:  New Objective established this session - CONTEMPLATION (Considering change and yet undecided); Intervened by assessing the negative and positive thinking (ambivalence) about behavior change    Also provided psychoeducation about behavioral health condition, symptoms, and treatment options    Care Plan review completed: No    Medication Review:  Changes to psychiatric medications, see updated Medication List in EPIC.     Medication Compliance:  Yes    Changes in Health Issues:   None reported    Chemical Use Review:   Substance Use: Chemical use reviewed, no active concerns identified      Tobacco Use: No current tobacco use.      Assessment: Current Emotional / Mental Status (status of significant symptoms):  Risk status (Self / Other harm or suicidal ideation)  Patient denies a history of suicidal ideation, suicide attempts, self-injurious behavior, homicidal ideation, homicidal behavior and and other safety concerns  Patient denies current fears or concerns for personal " safety.  Patient denies current or recent suicidal ideation or behaviors.  Patient denies current or recent homicidal ideation or behaviors.  Patient denies current or recent self injurious behavior or ideation.  Patient denies other safety concerns.  A safety and risk management plan has not been developed at this time, however patient was encouraged to call [place] [phone] should there be a change in any of these risk factors.    Appearance:   Appropriate   Eye Contact:   Fair   Psychomotor Behavior: Normal   Attitude:   Cooperative   Orientation:   All  Speech   Rate / Production: Normal    Volume:  Normal   Mood:    Normal  Affect:    Appropriate   Thought Content:  Clear   Thought Form:  Coherent  Logical   Insight:    Fair     Diagnoses:  1. Bipolar 2 disorder (H)        Collateral Reports Completed:  Not Applicable    Plan: (Homework, other):  Patient was given information about behavioral services and encouraged to schedule a follow up appointment with the clinic [facility] as needed.  He was also given information about mental health symptoms and treatment options .  CD Recommendations: No indications of CD issues.  Korina Meyers, YOCASTA, [facility]

## 2017-11-03 NOTE — TELEPHONE ENCOUNTER
Venessa    See pt mychart message    I saw that at the last OV you had mentioned to stop the lexapro    Advise    Tiffany Frost RN   [facility]

## 2017-11-28 NOTE — PROGRESS NOTES
"  SUBJECTIVE:   Ambrosio Kemp is a 27 year old male who presents to clinic today for the following health issues:    Depression Followup    Status since last visit: worsened at first but a lot better now - feels \"more even,\" less anxious, more motivation, more clarity, gets less upset; last dose increase on 11/13 to 50 mg daily.  Doesn't feel \"great\" or really happy    He spoke with his mom and found out she has a \"mood disorder, unspecified\" and takes lamictal    See PHQ-9 for current symptoms.  Other associated symptoms: None    Blurred vision, but has seemed to resolve with changing to glasses from contacts    URI last week, still some cough    Didn't know that he was supposed to wean of the Lexapro and had heightened anxiety and withdraw symptoms for one week    Complicating factors:   Significant life event:  Yes-  Break up and dog got sick   Current substance abuse:  None  Anxiety or Panic symptoms:  No    PHQ-9 Score and MyChart F/U Questions 8/23/2017 9/27/2017   Total Score 9 7   Q9: Suicide Ideation Not at all Not at all     In the past two weeks have you had thoughts of suicide or self-harm?  No.    Do you have concerns about your personal safety or the safety of others?   No    PHQ-9  English  PHQ-9   Any Language  Suicide Assessment Five-step Evaluation and Treatment (SAFE-T)      Amount of exercise or physical activity: 4-5 days/week for an average of greater than 60 minutes    Problems taking medications regularly: No    Medication side effects: none    Diet: vegetarian/vegan      Problem list and histories reviewed & adjusted, as indicated.  Additional history: as documented    Reviewed and updated as needed this visit by clinical staff     Reviewed and updated as needed this visit by Provider         ROS:  Constitutional, HEENT, cardiovascular, pulmonary, gi and gu systems are negative, except as otherwise noted.      OBJECTIVE:   /78  Pulse 80  Temp 98.7  F (37.1  C) (Oral)  Resp 16  Wt " 222 lb (100.7 kg)  SpO2 98%  BMI 32.51 kg/m2  Body mass index is 32.51 kg/(m^2).  GENERAL: healthy, alert and no distress  EYES: Eyes grossly normal to inspection, PERRL and conjunctivae and sclerae normal  HENT: ear canals and TM's normal, nose and mouth without ulcers or lesions  NECK: no adenopathy, no asymmetry, masses, or scars and thyroid normal to palpation  RESP: lungs clear to auscultation - no rales, rhonchi or wheezes; decrease lung sounds bases  CV: regular rate and rhythm, normal S1 S2, no S3 or S4, no murmur, click or rub, no peripheral edema and peripheral pulses strong  ABDOMEN: soft, nontender, no hepatosplenomegaly, no masses and bowel sounds normal  SKIN: no suspicious lesions or rashes  PSYCH: mentation appears normal, affect normal/bright    Diagnostic Test Results:  none     ASSESSMENT/PLAN:     Depression; recurrent episode-- Moderate,    Associated with the following complications:    TODD and Bipolar   Plan:  Medications:   Mood stabilizer - lamictal      (F31.81) Bipolar 2 disorder (H)  (primary encounter diagnosis)  Comment:   Plan: lamoTRIgine (LAMICTAL) 100 MG tablet, MENTAL         HEALTH REFERRAL  - Adult; Psychiatry and         Medication Management, Outpatient Treatment;         Individual/Couples/Family/Group Therapy/Health         Psychology; [facility]: [facility]         [phone]; The scheduling team will         contact you to schedu...    Excellent, but incomplete response with lamictal.  Will increase dose to 100 mg and follow-up 4 weeks.  Open to therapy and strong encouraged this.      (F33.1) Moderate episode of recurrent major depressive disorder (H)  Comment:   Plan: lamoTRIgine (LAMICTAL) 100 MG tablet, MENTAL         HEALTH REFERRAL  - Adult; Psychiatry and         Medication Management, Outpatient Treatment;         Individual/Couples/Family/Group Therapy/Health         Psychology; [facility]: [facility]         [phone]; The scheduling  team will         contact you to schedu...            (F41.1) TODD (generalized anxiety disorder)  Comment:   Plan: lamoTRIgine (LAMICTAL) 100 MG tablet, MENTAL         HEALTH REFERRAL  - Adult; Psychiatry and         Medication Management, Outpatient Treatment;         Individual/Couples/Family/Group Therapy/Health         Psychology; [facility]: [facility]         [phone]; The scheduling team will         contact you to schedu...              See Patient Instructions    JAC Javed [facility]

## 2017-11-29 ENCOUNTER — OFFICE VISIT (OUTPATIENT)
Dept: FAMILY MEDICINE | Facility: CLINIC | Age: 27
End: 2017-11-29
Payer: COMMERCIAL

## 2017-11-29 ENCOUNTER — DOCUMENTATION ONLY (OUTPATIENT)
Dept: PHARMACY | Facility: CLINIC | Age: 27
End: 2017-11-29

## 2017-11-29 VITALS
DIASTOLIC BLOOD PRESSURE: 78 MMHG | RESPIRATION RATE: 16 BRPM | HEART RATE: 80 BPM | SYSTOLIC BLOOD PRESSURE: 132 MMHG | OXYGEN SATURATION: 98 % | TEMPERATURE: 98.7 F | BODY MASS INDEX: 32.51 KG/M2 | WEIGHT: 222 LBS

## 2017-11-29 DIAGNOSIS — F31.81 BIPOLAR 2 DISORDER (H): Primary | ICD-10-CM

## 2017-11-29 DIAGNOSIS — F33.1 MODERATE EPISODE OF RECURRENT MAJOR DEPRESSIVE DISORDER (H): ICD-10-CM

## 2017-11-29 DIAGNOSIS — F41.1 GAD (GENERALIZED ANXIETY DISORDER): ICD-10-CM

## 2017-11-29 PROCEDURE — 99214 OFFICE O/P EST MOD 30 MIN: CPT | Performed by: NURSE PRACTITIONER

## 2017-11-29 RX ORDER — LAMOTRIGINE 100 MG/1
TABLET ORAL
Qty: 60 TABLET | Refills: 0 | Status: SHIPPED | OUTPATIENT
Start: 2017-11-29 | End: 2018-05-02

## 2017-11-29 NOTE — MR AVS SNAPSHOT
After Visit Summary   11/29/2017    Ambrosio Kemp    MRN: [id number]           Patient Information     Date Of Birth          1990        Visit Information        Provider Department      11/29/2017 4:30 PM Rin Mena APRN CNP [facility]        Today's Diagnoses     Bipolar 2 disorder (H)    -  1    Moderate episode of recurrent major depressive disorder (H)        TODD (generalized anxiety disorder)          Care Instructions    Increase lamictal to 100 mg once a day  Return in four weeks          Follow-ups after your visit        Additional Services     MENTAL HEALTH REFERRAL  - Adult; Psychiatry and Medication Management, Outpatient Treatment; Individual/Couples/Family/Group Therapy/Health Psychology; [facility]: [facility] [phone]; The scheduling team will contact you to schedu...       All scheduling is subject to the client's specific insurance plan & benefits, provider/location availability, and provider clinical specialities.  Please arrive 15 minutes early for your first appointment and bring your completed paperwork.    Carly Gershone - [place] [facility] Reid or Sugar Smith at [place];   Jay Theodore or Akanksha Wooten at [place];   Carie Bo  [facility]    Please be aware that coverage of these services is subject to the terms and limitations of your health insurance plan.  Call member services at your health plan with any benefit or coverage questions.                  Who to contact     If you have questions or need follow up information about today's clinic visit or your schedule please contact [facility] directly at [phone].  Normal or non-critical lab and imaging results will be communicated to you by MyChart, letter or phone within 4 business days after the clinic has received the results. If you do not hear from us within 7 days, please contact the clinic through MyChart or phone. If you have  a critical or abnormal lab result, we will notify you by phone as soon as possible.  Submit refill requests through [employer] or call your pharmacy and they will forward the refill request to us. Please allow 3 business days for your refill to be completed.          Additional Information About Your Visit        [email or web address] Information     [employer] gives you secure access to your electronic health record. If you see a primary care provider, you can also send messages to your care team and make appointments. If you have questions, please call your primary care clinic.  If you do not have a primary care provider, please call [phone] and they will assist you.        Care EveryWhere ID     This is your Care EveryWhere ID. This could be used by other organizations to access your [facility] medical records  [id number]        Your Vitals Were     Pulse Temperature Respirations Pulse Oximetry BMI (Body Mass Index)       80 98.7  F (37.1  C) (Oral) 16 98% 32.51 kg/m2        Blood Pressure from Last 3 Encounters:   11/29/17 132/78   11/01/17 138/80   09/27/17 140/78    Weight from Last 3 Encounters:   11/29/17 222 lb (100.7 kg)   11/01/17 217 lb 6.4 oz (98.6 kg)   09/27/17 216 lb 8 oz (98.2 kg)              We Performed the Following     MENTAL HEALTH REFERRAL  - Adult; Psychiatry and Medication Management, Outpatient Treatment; Individual/Couples/Family/Group Therapy/Health Psychology; G: [facility] [phone]; The scheduling team will contact you to schedu...          Today's Medication Changes          These changes are accurate as of: 11/29/17  5:10 PM.  If you have any questions, ask your nurse or doctor.               These medicines have changed or have updated prescriptions.        Dose/Directions    * lamoTRIgine 25 MG tablet   Commonly known as:  LaMICtal   This may have changed:  Another medication with the same name was added. Make sure you understand how and when to take each.   Used for:   Bipolar 2 disorder (H)   Changed by:  Annamaria Garcia        Dose:  25 mg   Take 1 tablet (25 mg) by mouth daily For two weeks, then increase to 50mg daily   Quantity:  45 tablet   Refills:  0       * lamoTRIgine 100 MG tablet   Commonly known as:  LAMICTAL   This may have changed:  You were already taking a medication with the same name, and this prescription was added. Make sure you understand how and when to take each.   Used for:  Bipolar 2 disorder (H), Moderate episode of recurrent major depressive disorder (H), TODD (generalized anxiety disorder)   Changed by:  Rin Mena APRN CNP        Take one tablet once daily   Quantity:  60 tablet   Refills:  0       * Notice:  This list has 2 medication(s) that are the same as other medications prescribed for you. Read the directions carefully, and ask your doctor or other care provider to review them with you.         Where to get your medicines      These medications were sent to [facility]/pharmacy #[id number] - [place], [place] - [address], [place] [address]     Phone:  [phone]     lamoTRIgine 100 MG tablet                Primary Care Provider Office Phone # Fax #    JAC Mckinney [facility] [phone]       [facility] [address] [address]        Equal Access to Services     MARTY RAMÍREZ AH: Hadii ino thompson hadasho Soomaali, waaxda luqadaha, qaybta kaalmada adeegyada, curtis smallwood hayliliana pulido . So [facility] [phone].    ATENCIÓN: Si habla español, tiene a galvan disposición servicios gratalyssaos de asistencia lingüística. Llame al [phone].    We comply with applicable federal civil rights laws and [place] laws. We do not discriminate on the basis of race, color, national origin, age, disability, sex, sexual orientation, or gender identity.            Thank you!     Thank you for choosing [facility]  for your care. Our goal is always to provide you with  excellent care. Hearing back from our patients is one way we can continue to improve our services. Please take a few minutes to complete the written survey that you may receive in the mail after your visit with us. Thank you!             Your Updated Medication List - Protect others around you: Learn how to safely use, store and throw away your medicines at www.disposemymeds.org.          This list is accurate as of: 11/29/17  5:10 PM.  Always use your most recent med list.                   Brand Name Dispense Instructions for use Diagnosis    escitalopram 20 MG tablet    LEXAPRO    90 tablet    1 tablet orally daily    TODD (generalized anxiety disorder)       * lamoTRIgine 25 MG tablet    LaMICtal    45 tablet    Take 1 tablet (25 mg) by mouth daily For two weeks, then increase to 50mg daily    Bipolar 2 disorder (H)       * lamoTRIgine 100 MG tablet    LAMICTAL    60 tablet    Take one tablet once daily    Bipolar 2 disorder (H), Moderate episode of recurrent major depressive disorder (H), TODD (generalized anxiety disorder)       LORazepam 1 MG tablet    ATIVAN    20 tablet    Take 1 tablet (1 mg) by mouth daily as needed for anxiety    TODD (generalized anxiety disorder)       * Notice:  This list has 2 medication(s) that are the same as other medications prescribed for you. Read the directions carefully, and ask your doctor or other care provider to review them with you.

## 2017-11-29 NOTE — NURSING NOTE
"Chief Complaint   Patient presents with     Depression       Initial /78  Pulse 80  Temp 98.7  F (37.1  C) (Oral)  Resp 16  Wt 222 lb (100.7 kg)  SpO2 98%  BMI 32.51 kg/m2 Estimated body mass index is 32.51 kg/(m^2) as calculated from the following:    Height as of 8/23/17: 5' 9.29\" (1.76 m).    Weight as of this encounter: 222 lb (100.7 kg).  Medication Reconciliation: complete     Iman Vallejo MA      "

## 2017-11-29 NOTE — PROGRESS NOTES
"Clinical Consult:                                                    Ambrosio Kemp is a 27 year old male coming in for a clinical pharmacist consult.  He was referred to me from Venessa Mena. Pt seen with PCP today.     Reason for Consult: Lamictal start on 11/1/17    Discussion: Follow-up on Lamictal start. Pt started Lamictal 25mg on 11/1/17 and increased to Lamictal 50mg daily since 11/13/17. He has felt more \"balanced\" since starting the Lamictal. Also says that others around him have said that he is also more stable. Denies SI today. He says that he wants to increase further for further balance. Denies side effects.     Plan:  1. Increase Lamictal to 100mg daily.     "

## 2017-12-21 ENCOUNTER — MYC MEDICAL ADVICE (OUTPATIENT)
Dept: FAMILY MEDICINE | Facility: CLINIC | Age: 27
End: 2017-12-21

## 2017-12-21 DIAGNOSIS — F33.1 MODERATE EPISODE OF RECURRENT MAJOR DEPRESSIVE DISORDER (H): ICD-10-CM

## 2017-12-21 DIAGNOSIS — F41.1 GAD (GENERALIZED ANXIETY DISORDER): ICD-10-CM

## 2017-12-21 DIAGNOSIS — F31.81 BIPOLAR 2 DISORDER (H): ICD-10-CM

## 2018-01-02 NOTE — PROGRESS NOTES
SUBJECTIVE:   Ambrosio Kemp is a 27 year old male who presents to clinic today for the following health issues:    Depression and Anxiety Follow-Up    Status since last visit: Improved     Tired - couldn't sleep last night, sore throat, plugged ears.  No fever, able to swallow    Anxiety vastly improved, only a few days of depression and not deep.  Does want to increase to get more stability.    Other associated symptoms: None    Complicating factors:     Significant life event: No     Current substance abuse: None    PHQ-9 Score and MyChart F/U Questions 8/23/2017 9/27/2017 1/3/2018   Total Score 9 7 4   Q9: Suicide Ideation Not at all Not at all Not at all     TODD-7 SCORE 8/23/2017 9/27/2017 1/3/2018   Total Score 9 6 4     In the past two weeks have you had thoughts of suicide or self-harm?  No.    Do you have concerns about your personal safety or the safety of others?   No    PHQ-9  English  PHQ-9   Any Language  GAD7  Suicide Assessment Five-step Evaluation and Treatment (SAFE-T)      Amount of exercise or physical activity: 4-5 days/week for an average of 45-60 minutes    Problems taking medications regularly: No    Medication side effects: none    Diet: regular (no restrictions) and vegetarian/vegan    Questions/Concerns: none    Had sex with new male partner last night  Typically gets testing, including anal pap smears, at [facility] and last testing was Nov 2017  Has been interested in [employer], but [facility] [place] [facility] has said he does not qualify for their program  Had been in long term relationship and now is dating.  Does ask testing status of partners.  Does use condoms, but not 100% of the time.    Problem list and histories reviewed & adjusted, as indicated.  Additional history: as documented      Reviewed and updated as needed this visit by clinical staffTobacco  Allergies  Meds       Reviewed and updated as needed this visit by Provider         ROS:  Constitutional, HEENT, cardiovascular, pulmonary, gi and  "gu systems are negative, except as otherwise noted.      OBJECTIVE:   /90  Pulse 96  Temp 98  F (36.7  C) (Oral)  Ht 5' 9\" (1.753 m)  Wt 220 lb 3.2 oz (99.9 kg)  SpO2 96%  BMI 32.52 kg/m2  Body mass index is 32.52 kg/(m^2).  GENERAL: healthy, alert and no distress  EYES: Eyes grossly normal to inspection, PERRL and conjunctivae and sclerae normal  HENT: normal cephalic/atraumatic, both ears: clear effusion, nose and mouth without ulcers or lesions, nasal mucosa edematous , oropharynx clear, oral mucous membranes moist, non-tender tonsillar hypertrophy and tonsillar exudate L>R,  NECK: bilateral anterior cervical adenopathy, no asymmetry, masses, or scars and thyroid normal to palpation  RESP: lungs clear to auscultation - no rales, rhonchi or wheezes  CV: regular rate and rhythm, normal S1 S2, no S3 or S4, no murmur, click or rub, no peripheral edema and peripheral pulses strong  ABDOMEN: soft, nontender, no hepatosplenomegaly, no masses and bowel sounds normal  PSYCH: mentation appears normal, affect normal/bright    Diagnostic Test Results:  pending    Please note greater than 50% of this 25 minute appointment were spent in counseling with the patient of the issues described above in the history of present illness and in the plan, including bipolar management, PrEP informed consent including pre-med testing, med side effects, adherence, safer sex, and follow-up timing.  ASSESSMENT/PLAN:     Depression; recurrent episode-- Moderate   Associated with the following complications:    Bipolar   Plan:  Medications:   Increase lamictal        (F31.81) Bipolar 2 disorder (H)  (primary encounter diagnosis)  Comment:   Plan: lamoTRIgine (LAMICTAL) 200 MG tablet        Increase and recheck in two months    (F33.1) Moderate episode of recurrent major depressive disorder (H)  Comment:   Plan: lamoTRIgine (LAMICTAL) 200 MG tablet            (F41.1) TODD (generalized anxiety disorder)  Comment:   Plan: lamoTRIgine " (LAMICTAL) 200 MG tablet            (Z79.899) Encounter for long-term (current) use of medications  Comment:   Plan: CBC with platelets differential, Comprehensive         metabolic panel, HIV Antigen Antibody Combo  Candidate for PrEP per CDC guidelines.  Reviewed informed consent and will do HIV testing today and put through rx when testing is back.  Aware of 3 month follow-up guidelines and has been meticulous with taking lamictal.           See Patient Instructions    JAC Javed CNP  [facility]

## 2018-01-03 ENCOUNTER — OFFICE VISIT (OUTPATIENT)
Dept: FAMILY MEDICINE | Facility: CLINIC | Age: 28
End: 2018-01-03
Payer: COMMERCIAL

## 2018-01-03 VITALS
HEART RATE: 96 BPM | HEIGHT: 69 IN | OXYGEN SATURATION: 96 % | BODY MASS INDEX: 32.61 KG/M2 | TEMPERATURE: 98 F | SYSTOLIC BLOOD PRESSURE: 142 MMHG | WEIGHT: 220.2 LBS | DIASTOLIC BLOOD PRESSURE: 90 MMHG

## 2018-01-03 DIAGNOSIS — F41.1 GAD (GENERALIZED ANXIETY DISORDER): ICD-10-CM

## 2018-01-03 DIAGNOSIS — Z79.899 ENCOUNTER FOR LONG-TERM (CURRENT) USE OF MEDICATIONS: ICD-10-CM

## 2018-01-03 DIAGNOSIS — F31.81 BIPOLAR 2 DISORDER (H): Primary | ICD-10-CM

## 2018-01-03 DIAGNOSIS — F33.1 MODERATE EPISODE OF RECURRENT MAJOR DEPRESSIVE DISORDER (H): ICD-10-CM

## 2018-01-03 LAB
ALBUMIN SERPL-MCNC: 3.8 G/DL (ref 3.4–5)
ALP SERPL-CCNC: 103 U/L (ref 40–150)
ALT SERPL W P-5'-P-CCNC: 38 U/L (ref 0–70)
ANION GAP SERPL CALCULATED.3IONS-SCNC: 10 MMOL/L (ref 3–14)
AST SERPL W P-5'-P-CCNC: 59 U/L (ref 0–45)
BASOPHILS # BLD AUTO: 0 10E9/L (ref 0–0.2)
BASOPHILS NFR BLD AUTO: 0.2 %
BILIRUB SERPL-MCNC: 1 MG/DL (ref 0.2–1.3)
BUN SERPL-MCNC: 10 MG/DL (ref 7–30)
CALCIUM SERPL-MCNC: 8.8 MG/DL (ref 8.5–10.1)
CHLORIDE SERPL-SCNC: 103 MMOL/L (ref 94–109)
CO2 SERPL-SCNC: 26 MMOL/L (ref 20–32)
CREAT SERPL-MCNC: 0.62 MG/DL (ref 0.66–1.25)
DIFFERENTIAL METHOD BLD: ABNORMAL
EOSINOPHIL # BLD AUTO: 0.1 10E9/L (ref 0–0.7)
EOSINOPHIL NFR BLD AUTO: 0.7 %
ERYTHROCYTE [DISTWIDTH] IN BLOOD BY AUTOMATED COUNT: 12.9 % (ref 10–15)
GFR SERPL CREATININE-BSD FRML MDRD: >90 ML/MIN/1.7M2
GLUCOSE SERPL-MCNC: 92 MG/DL (ref 70–99)
HCT VFR BLD AUTO: 46.8 % (ref 40–53)
HGB BLD-MCNC: 16.2 G/DL (ref 13.3–17.7)
HIV 1+2 AB+HIV1 P24 AG SERPL QL IA: NONREACTIVE
LYMPHOCYTES # BLD AUTO: 1.6 10E9/L (ref 0.8–5.3)
LYMPHOCYTES NFR BLD AUTO: 10.7 %
MCH RBC QN AUTO: 33.5 PG (ref 26.5–33)
MCHC RBC AUTO-ENTMCNC: 34.6 G/DL (ref 31.5–36.5)
MCV RBC AUTO: 97 FL (ref 78–100)
MONOCYTES # BLD AUTO: 1.2 10E9/L (ref 0–1.3)
MONOCYTES NFR BLD AUTO: 8.3 %
NEUTROPHILS # BLD AUTO: 11.8 10E9/L (ref 1.6–8.3)
NEUTROPHILS NFR BLD AUTO: 80.1 %
PLATELET # BLD AUTO: 185 10E9/L (ref 150–450)
POTASSIUM SERPL-SCNC: 3.8 MMOL/L (ref 3.4–5.3)
PROT SERPL-MCNC: 8.1 G/DL (ref 6.8–8.8)
RBC # BLD AUTO: 4.83 10E12/L (ref 4.4–5.9)
SODIUM SERPL-SCNC: 139 MMOL/L (ref 133–144)
WBC # BLD AUTO: 14.8 10E9/L (ref 4–11)

## 2018-01-03 PROCEDURE — 99214 OFFICE O/P EST MOD 30 MIN: CPT | Performed by: NURSE PRACTITIONER

## 2018-01-03 PROCEDURE — 80053 COMPREHEN METABOLIC PANEL: CPT | Performed by: NURSE PRACTITIONER

## 2018-01-03 PROCEDURE — 85025 COMPLETE CBC W/AUTO DIFF WBC: CPT | Performed by: NURSE PRACTITIONER

## 2018-01-03 PROCEDURE — 36415 COLL VENOUS BLD VENIPUNCTURE: CPT | Performed by: NURSE PRACTITIONER

## 2018-01-03 PROCEDURE — 87389 HIV-1 AG W/HIV-1&-2 AB AG IA: CPT | Performed by: NURSE PRACTITIONER

## 2018-01-03 RX ORDER — LAMOTRIGINE 200 MG/1
200 TABLET ORAL DAILY
Qty: 90 TABLET | Refills: 0 | Status: SHIPPED | OUTPATIENT
Start: 2018-01-03 | End: 2018-04-09

## 2018-01-03 ASSESSMENT — ANXIETY QUESTIONNAIRES
1. FEELING NERVOUS, ANXIOUS, OR ON EDGE: SEVERAL DAYS
2. NOT BEING ABLE TO STOP OR CONTROL WORRYING: NOT AT ALL
6. BECOMING EASILY ANNOYED OR IRRITABLE: NOT AT ALL
GAD7 TOTAL SCORE: 4
5. BEING SO RESTLESS THAT IT IS HARD TO SIT STILL: SEVERAL DAYS
IF YOU CHECKED OFF ANY PROBLEMS ON THIS QUESTIONNAIRE, HOW DIFFICULT HAVE THESE PROBLEMS MADE IT FOR YOU TO DO YOUR WORK, TAKE CARE OF THINGS AT HOME, OR GET ALONG WITH OTHER PEOPLE: NOT DIFFICULT AT ALL
7. FEELING AFRAID AS IF SOMETHING AWFUL MIGHT HAPPEN: NOT AT ALL
3. WORRYING TOO MUCH ABOUT DIFFERENT THINGS: SEVERAL DAYS

## 2018-01-03 ASSESSMENT — PATIENT HEALTH QUESTIONNAIRE - PHQ9
SUM OF ALL RESPONSES TO PHQ QUESTIONS 1-9: 4
5. POOR APPETITE OR OVEREATING: SEVERAL DAYS

## 2018-01-03 NOTE — NURSING NOTE
"Chief Complaint   Patient presents with     Depression     Anxiety       Initial /90  Pulse 96  Temp 98  F (36.7  C) (Oral)  Ht 5' 9\" (1.753 m)  Wt 220 lb 3.2 oz (99.9 kg)  SpO2 96%  BMI 32.52 kg/m2 Estimated body mass index is 32.52 kg/(m^2) as calculated from the following:    Height as of this encounter: 5' 9\" (1.753 m).    Weight as of this encounter: 220 lb 3.2 oz (99.9 kg).  Medication Reconciliation: complete       Jairon Parra MA      "

## 2018-01-03 NOTE — MR AVS SNAPSHOT
After Visit Summary   1/3/2018    Ambrosio Kemp    MRN: [id number]           Patient Information     Date Of Birth          1990        Visit Information        Provider Department      1/3/2018 9:30 AM Rin Mena APRN CNP [facility]        Today's Diagnoses     Bipolar 2 disorder (H)    -  1    Moderate episode of recurrent major depressive disorder (H)        TODD (generalized anxiety disorder)        Encounter for long-term (current) use of medications          Care Instructions    Vitamin D [id number] IU    If throat pain worsens or you get a fever, please return to the clinic - I will make sure to get you in  If you are unable to swallow, please go to the ER          Follow-ups after your visit        Who to contact     If you have questions or need follow up information about today's clinic visit or your schedule please contact [facility] directly at [phone].  Normal or non-critical lab and imaging results will be communicated to you by MyChart, letter or phone within 4 business days after the clinic has received the results. If you do not hear from us within 7 days, please contact the clinic through Pixelpipehart or phone. If you have a critical or abnormal lab result, we will notify you by phone as soon as possible.  Submit refill requests through Matchalarm or call your pharmacy and they will forward the refill request to us. Please allow 3 business days for your refill to be completed.          Additional Information About Your Visit        MyChart Information     Matchalarm gives you secure access to your electronic health record. If you see a primary care provider, you can also send messages to your care team and make appointments. If you have questions, please call your primary care clinic.  If you do not have a primary care provider, please call [phone] and they will assist you.        Care EveryWhere ID     This is your Care EveryWhere ID. This  "could be used by other organizations to access your [facility] medical records  [id number]        Your Vitals Were     Pulse Temperature Height Pulse Oximetry BMI (Body Mass Index)       96 98  F (36.7  C) (Oral) 5' 9\" (1.753 m) 96% 32.52 kg/m2        Blood Pressure from Last 3 Encounters:   01/03/18 142/90   11/29/17 132/78   11/01/17 138/80    Weight from Last 3 Encounters:   01/03/18 220 lb 3.2 oz (99.9 kg)   11/29/17 222 lb (100.7 kg)   11/01/17 217 lb 6.4 oz (98.6 kg)              We Performed the Following     CBC with platelets differential     Comprehensive metabolic panel     HIV Antigen Antibody Combo          Today's Medication Changes          These changes are accurate as of: 1/3/18 10:30 AM.  If you have any questions, ask your nurse or doctor.               These medicines have changed or have updated prescriptions.        Dose/Directions    * lamoTRIgine 100 MG tablet   Commonly known as:  LAMICTAL   This may have changed:  Another medication with the same name was changed. Make sure you understand how and when to take each.   Used for:  Bipolar 2 disorder (H), Moderate episode of recurrent major depressive disorder (H), TODD (generalized anxiety disorder)   Changed by:  Rin Mena APRN CNP        Take one tablet once daily   Quantity:  60 tablet   Refills:  0       * lamoTRIgine 200 MG tablet   Commonly known as:  LaMICtal   This may have changed:    - medication strength  - how much to take  - additional instructions   Used for:  Bipolar 2 disorder (H), Moderate episode of recurrent major depressive disorder (H), TODD (generalized anxiety disorder)   Changed by:  Rin Mena APRN CNP        Dose:  200 mg   Take 1 tablet (200 mg) by mouth daily   Quantity:  90 tablet   Refills:  0       * Notice:  This list has 2 medication(s) that are the same as other medications prescribed for you. Read the directions carefully, and ask your doctor or other care provider to review them with you.       "   Where to get your medicines      These [facility] to [id number] - [place], [place] - [address]  [address], [place] [address]     Phone:  [phone]     lamoTRIgine 200 MG tablet                Primary Care Provider Office Phone # Fax #    JAC Mckinney [phone]       [facility] [address] [facility] [address]  [facility] [address]        Equal Access to Services     MARTY RAMÍREZ : Hadii aad ku hadasho Soomaali, waaxda luqadaha, qaybta kaalmada adeegyada, waxay idiin hayaan adeeg kharash la'liliana . So [facility] [phone].    ATENCIÓN: Si habla español, tiene a galvan disposición servicios gratuitos de asistencia lingüística. [facility] [phone].    We comply with applicable federal civil rights laws and [place] laws. We do not discriminate on the basis of race, color, national origin, age, disability, sex, sexual orientation, or gender identity.            Thank you!     Thank you for choosing [facility]  for your care. Our goal is always to provide you with excellent care. Hearing back from our patients is one way we can continue to improve our services. Please take a few minutes to complete the written survey that you may receive in the mail after your visit with us. Thank you!             Your Updated Medication List - Protect others around you: Learn how to safely use, store and throw away your medicines at www.disposemymeds.org.          This list is accurate as of: 1/3/18 10:30 AM.  Always use your most recent med list.                   Brand Name Dispense Instructions for use Diagnosis    * lamoTRIgine 100 MG tablet    LAMICTAL    60 tablet    Take one tablet once daily    Bipolar 2 disorder (H), Moderate episode of recurrent major depressive disorder (H), TODD (generalized anxiety disorder)       * lamoTRIgine 200 MG tablet    LaMICtal    90 tablet    Take 1 tablet (200 mg) by mouth daily    Bipolar 2 disorder (H), Moderate  episode of recurrent major depressive disorder (H), TODD (generalized anxiety disorder)       LORazepam 1 MG tablet    ATIVAN    20 tablet    Take 1 tablet (1 mg) by mouth daily as needed for anxiety    TODD (generalized anxiety disorder)       * Notice:  This list has 2 medication(s) that are the same as other medications prescribed for you. Read the directions carefully, and ask your doctor or other care provider to review them with you.

## 2018-01-03 NOTE — PATIENT INSTRUCTIONS
Vitamin D [id number] IU    If throat pain worsens or you get a fever, please return to the clinic - I will make sure to get you in  If you are unable to swallow, please go to the ER

## 2018-01-04 ASSESSMENT — ANXIETY QUESTIONNAIRES: GAD7 TOTAL SCORE: 4

## 2018-01-05 ENCOUNTER — MYC MEDICAL ADVICE (OUTPATIENT)
Dept: FAMILY MEDICINE | Facility: CLINIC | Age: 28
End: 2018-01-05

## 2018-01-05 NOTE — PROGRESS NOTES
Ambrosio,    The CBC looked like an acute bacterial infection.  I presume this is related to your tonsils.  With your relative health, low pain and ability to swallow my inclination is to not treat.  However, if you develop a fever, severe throat pain or cannot swallow I'd like you to go to the ER this weekend.  If it is the weekday next week, you can come into the clinic.  I also need to have these results normalize before we can start PrEP.  Could you come in next week or the following week to recheck?  If you have any questions, please feel free to contact the clinic.    JERSEY Izquierdo

## 2018-01-17 DIAGNOSIS — F41.1 GAD (GENERALIZED ANXIETY DISORDER): ICD-10-CM

## 2018-01-17 DIAGNOSIS — F33.1 MODERATE EPISODE OF RECURRENT MAJOR DEPRESSIVE DISORDER (H): ICD-10-CM

## 2018-01-17 DIAGNOSIS — F31.81 BIPOLAR 2 DISORDER (H): ICD-10-CM

## 2018-01-17 RX ORDER — LAMOTRIGINE 100 MG/1
TABLET ORAL
Qty: 60 TABLET | Refills: 0 | Status: CANCELLED | OUTPATIENT
Start: 2018-01-17

## 2018-01-17 NOTE — TELEPHONE ENCOUNTER
"Requested Prescriptions   Pending Prescriptions Disp Refills     lamoTRIgine (LAMICTAL) 100 MG tablet 60 tablet 0    Last Written Prescription Date:  11/29/17  Last Fill Quantity: 60,  # refills: 0   Last Office Visit with [facility] or [facility] prescribing provider:  1/3/18   Future Office Visit:      Sig: Take one tablet once daily    Anticonvulsants Protocol  Failed    1/17/2018  3:01 PM       Failed - Review Authorizing provider's last note.     Refer to last progress notes: confirm request is for original authorizing provider (cannot be through other providers).         Passed - Recent or future visit with authorizing provider    Patient had office visit in the last 6 months or has a visit in the next 30 days with authorizing provider.  See \"Patient Info\" tab in inbasket, or \"Choose Columns\" in Meds & Orders section of the refill encounter.              "

## 2018-01-18 NOTE — TELEPHONE ENCOUNTER
Left a message for patient, per last office visit note increased lamictal to 200 mg daily.     HAILEE TitusN RN  [facility]

## 2018-01-19 ENCOUNTER — TELEPHONE (OUTPATIENT)
Dept: FAMILY MEDICINE | Facility: CLINIC | Age: 28
End: 2018-01-19

## 2018-01-19 DIAGNOSIS — F33.1 MODERATE EPISODE OF RECURRENT MAJOR DEPRESSIVE DISORDER (H): ICD-10-CM

## 2018-01-19 DIAGNOSIS — Z79.899 ENCOUNTER FOR LONG-TERM (CURRENT) USE OF MEDICATIONS: Primary | ICD-10-CM

## 2018-01-19 DIAGNOSIS — F31.81 BIPOLAR 2 DISORDER (H): ICD-10-CM

## 2018-01-19 DIAGNOSIS — F41.1 GAD (GENERALIZED ANXIETY DISORDER): ICD-10-CM

## 2018-01-19 LAB
BASOPHILS # BLD AUTO: 0.1 10E9/L (ref 0–0.2)
BASOPHILS NFR BLD AUTO: 0.9 %
DIFFERENTIAL METHOD BLD: ABNORMAL
EOSINOPHIL # BLD AUTO: 0.1 10E9/L (ref 0–0.7)
EOSINOPHIL NFR BLD AUTO: 2.2 %
ERYTHROCYTE [DISTWIDTH] IN BLOOD BY AUTOMATED COUNT: 13.1 % (ref 10–15)
HCT VFR BLD AUTO: 45.6 % (ref 40–53)
HGB BLD-MCNC: 15.6 G/DL (ref 13.3–17.7)
LYMPHOCYTES # BLD AUTO: 2 10E9/L (ref 0.8–5.3)
LYMPHOCYTES NFR BLD AUTO: 36.7 %
MCH RBC QN AUTO: 33.8 PG (ref 26.5–33)
MCHC RBC AUTO-ENTMCNC: 34.2 G/DL (ref 31.5–36.5)
MCV RBC AUTO: 99 FL (ref 78–100)
MONOCYTES # BLD AUTO: 0.5 10E9/L (ref 0–1.3)
MONOCYTES NFR BLD AUTO: 9.6 %
NEUTROPHILS # BLD AUTO: 2.8 10E9/L (ref 1.6–8.3)
NEUTROPHILS NFR BLD AUTO: 50.6 %
PLATELET # BLD AUTO: 243 10E9/L (ref 150–450)
RBC # BLD AUTO: 4.62 10E12/L (ref 4.4–5.9)
WBC # BLD AUTO: 5.5 10E9/L (ref 4–11)

## 2018-01-19 PROCEDURE — 36415 COLL VENOUS BLD VENIPUNCTURE: CPT | Performed by: NURSE PRACTITIONER

## 2018-01-19 PROCEDURE — 85025 COMPLETE CBC W/AUTO DIFF WBC: CPT | Performed by: NURSE PRACTITIONER

## 2018-01-19 RX ORDER — EMTRICITABINE AND TENOFOVIR DISOPROXIL FUMARATE 200; 300 MG/1; MG/1
1 TABLET, FILM COATED ORAL DAILY
Qty: 90 TABLET | Refills: 0 | Status: SHIPPED | OUTPATIENT
Start: 2018-01-19 | End: 2018-04-09

## 2018-01-19 NOTE — TELEPHONE ENCOUNTER
Left message for patient that labs back to normal and a prescription was sent in.    HAILEE AnneN, RN  [facility]

## 2018-01-19 NOTE — TELEPHONE ENCOUNTER
Labs were back to normal.  [facility]  Patient should plan to return within 3 months - ok if a couple weeks early.  JERSEY Izquierdo

## 2018-04-09 DIAGNOSIS — F33.1 MODERATE EPISODE OF RECURRENT MAJOR DEPRESSIVE DISORDER (H): ICD-10-CM

## 2018-04-09 DIAGNOSIS — F41.1 GAD (GENERALIZED ANXIETY DISORDER): ICD-10-CM

## 2018-04-09 DIAGNOSIS — Z79.899 ENCOUNTER FOR LONG-TERM (CURRENT) USE OF MEDICATIONS: ICD-10-CM

## 2018-04-09 DIAGNOSIS — F31.81 BIPOLAR 2 DISORDER (H): ICD-10-CM

## 2018-04-09 NOTE — TELEPHONE ENCOUNTER
"Requested Prescriptions   Pending Prescriptions Disp Refills     lamoTRIgine (LAMICTAL) 200 MG tablet 90 tablet 0    Last Written Prescription Date:  1/318  Last Fill Quantity: 90,  # refills: 0   Last office visit: 1/3/2018 with prescribing provider:  1/3/18   Future Office Visit:     Sig: Take 1 tablet (200 mg) by mouth daily    Anticonvulsants Protocol  Failed    4/9/2018  4:02 PM       Failed - Review Authorizing provider's last note.     Refer to last progress notes: confirm request is for original authorizing provider (cannot be through other providers).         Passed - Recent (6 mo) or future (30 days) visit within the authorizing provider's specialty    Patient had office visit in the last 6 months or has a visit in the next 30 days with authorizing provider or within the authorizing provider's specialty.  See \"Patient Info\" tab in inbasket, or \"Choose Columns\" in Meds & Orders section of the refill encounter.            emtricitabine-tenofovir (TRUVADA) 200-300 MG per tablet 90 tablet 0    Last Written Prescription Date:  1/19/18  Last Fill Quantity: 90,  # refills: 0   Last office visit: 1/3/2018 with prescribing provider:  1/3/18   Future Office Visit:     Sig: Take 1 tablet by mouth daily due for office visit at end of each fill    There is no refill protocol information for this order          "

## 2018-04-10 RX ORDER — EMTRICITABINE AND TENOFOVIR DISOPROXIL FUMARATE 200; 300 MG/1; MG/1
1 TABLET, FILM COATED ORAL DAILY
Qty: 90 TABLET | Refills: 0 | Status: SHIPPED | OUTPATIENT
Start: 2018-04-10 | End: 2018-07-09

## 2018-04-10 RX ORDER — LAMOTRIGINE 200 MG/1
200 TABLET ORAL DAILY
Qty: 90 TABLET | Refills: 0 | Status: SHIPPED | OUTPATIENT
Start: 2018-04-10 | End: 2018-07-09

## 2018-04-10 NOTE — TELEPHONE ENCOUNTER
Venessa/Provider Pool,    Patient is requesting a refill for Truvada 200-300 mg tablets.    Routing to provider because medication is not on refill protocol.    Please review/sign or advise.      Prescription approved per [facility] Refill Protocol.-- fara Alvarez RN  [facility]

## 2018-05-01 NOTE — PROGRESS NOTES
SUBJECTIVE:   Ambrosio Kemp is a 27 year old male who presents to clinic today for the following health issues:      Bipolar Follow-Up    Status since last visit: Improved     Wondering about increasing dose.  Some ups and down and therapist; last richard phase three weeks ago that lasted three days - support groups and friends; moderate depression phase for a few days    Other associated symptoms:None    Complicating factors:     Significant life event: No     Current substance abuse: None    PHQ-9 8/23/2017 9/27/2017 1/3/2018   Total Score 9 7 4   Q9: Suicide Ideation Not at all Not at all Not at all     TODD-7 SCORE 8/23/2017 9/27/2017 1/3/2018   Total Score 9 6 4     In the past two weeks have you had thoughts of suicide or self-harm?  No.    Do you have concerns about your personal safety or the safety of others?   No  PHQ-9  English  PHQ-9   Any Language  TODD-7  Suicide Assessment Five-step Evaluation and Treatment (SAFE-T)    Amount of exercise or physical activity: 4-5 days/week for an average of 30-45 minutes    Problems taking medications regularly: No    Medication side effects: none    Diet: vegetarian/vegan    PReP follow-up    Percentage of days taking medication:  Once a day   Last STI screening:  March 2018  History of STI:  None  Sexually active: Yes - approximately 5   Using condoms:  Yes  Known exposures:  Maybe gonorrhea from partner who had oral  - prescribed abx in March though test was negative  Symptoms of concern:  None    Problem list and histories reviewed & adjusted, as indicated.  Additional history: as documented    Reviewed and updated as needed this visit by clinical staff       Reviewed and updated as needed this visit by Provider       ROS:  Constitutional, HEENT, cardiovascular, pulmonary, gi and gu systems are negative, except as otherwise noted.    OBJECTIVE:     /86  Pulse 100  Temp 97.9  F (36.6  C) (Oral)  Wt 221 lb 12.8 oz (100.6 kg)  SpO2 96%  BMI 32.75 kg/m2  Body  mass index is 32.75 kg/(m^2).  GENERAL: healthy, alert and no distress  EYES: Eyes grossly normal to inspection, PERRL and conjunctivae and sclerae normal  HENT: ear canals and TM's normal, nose and mouth without ulcers or lesions  NECK: no adenopathy, no asymmetry, masses, or scars and thyroid normal to palpation  RESP: lungs clear to auscultation - no rales, rhonchi or wheezes  CV: regular rate and rhythm, normal S1 S2, no S3 or S4, no murmur, click or rub, no peripheral edema and peripheral pulses strong  ABDOMEN: soft, nontender, no hepatosplenomegaly, no masses and bowel sounds normal  MS: no gross musculoskeletal defects noted, no edema  NEURO: Normal strength and tone, mentation intact and speech normal  MENTAL STATUS EXAM  Appearance: appropriate  Attitude: cooperative  Behavior: normal  Eye Contact: normal  Speech: normal  Orientation: oriented to person, place, time and situation  Mood:  Currently happy  Affect: anxious  Thought Process: clear  Suicidal Ideation: reports no thoughts, no intention  Hallucination: no    Diagnostic Test Results:  Results for orders placed or performed in visit on 05/02/18 (from the past 24 hour(s))   CBC with platelets differential   Result Value Ref Range    WBC 5.8 4.0 - 11.0 10e9/L    RBC Count 5.15 4.4 - 5.9 10e12/L    Hemoglobin 16.9 13.3 - 17.7 g/dL    Hematocrit 49.5 40.0 - 53.0 %    MCV 96 78 - 100 fl    MCH 32.8 26.5 - 33.0 pg    MCHC 34.1 31.5 - 36.5 g/dL    RDW 12.4 10.0 - 15.0 %    Platelet Count 189 150 - 450 10e9/L    Diff Method Automated Method     % Neutrophils 55.1 %    % Lymphocytes 33.5 %    % Monocytes 8.3 %    % Eosinophils 2.8 %    % Basophils 0.3 %    Absolute Neutrophil 3.2 1.6 - 8.3 10e9/L    Absolute Lymphocytes 1.9 0.8 - 5.3 10e9/L    Absolute Monocytes 0.5 0.0 - 1.3 10e9/L    Absolute Eosinophils 0.2 0.0 - 0.7 10e9/L    Absolute Basophils 0.0 0.0 - 0.2 10e9/L   Comprehensive metabolic panel   Result Value Ref Range    Sodium 140 133 - 144 mmol/L  "   Potassium 4.0 3.4 - 5.3 mmol/L    Chloride 107 94 - 109 mmol/L    Carbon Dioxide 24 20 - 32 mmol/L    Anion Gap 9 3 - 14 mmol/L    Glucose 96 70 - 99 mg/dL    Urea Nitrogen 12 7 - 30 mg/dL    Creatinine 0.71 0.66 - 1.25 mg/dL    GFR Estimate >90 >60 mL/min/1.7m2    GFR Estimate If Black >90 >60 mL/min/1.7m2    Calcium 9.4 8.5 - 10.1 mg/dL    Bilirubin Total 0.4 0.2 - 1.3 mg/dL    Albumin 4.3 3.4 - 5.0 g/dL    Protein Total 8.2 6.8 - 8.8 g/dL    Alkaline Phosphatase 82 40 - 150 U/L     (H) 0 - 70 U/L     (H) 0 - 45 U/L     Remainder pending    ASSESSMENT/PLAN:     Depression; recurrent episode-- Partial remission   Associated with the following complications:    bipolar   Plan:  Referrals/Other:  MTM co-visit with next appt      BMI:   Estimated body mass index is 32.75 kg/(m^2) as calculated from the following:    Height as of 1/3/18: 5' 9\" (1.753 m).    Weight as of this encounter: 221 lb 12.8 oz (100.6 kg).   Weight management plan: Discussed healthy diet and exercise guidelines and patient will follow up in 3 months in clinic to re-evaluate.      (Z79.899) Encounter for long-term (current) use of medications  (primary encounter diagnosis)  Comment: PReP  Plan: CBC with platelets differential, HIV Antigen         Antibody Combo, Treponema Abs w Reflex to RPR         and Titer, Comprehensive metabolic panel,         CANCELED: NEISSERIA GONORRHOEA PCR, CANCELED:         CHLAMYDIA TRACHOMATIS PCR            (F31.81) Bipolar 2 disorder (H)  Comment:   Plan: Comprehensive metabolic panel, MED THERAPY         MANAGE REFERRAL   At max approved dose of lamictal for bipolar.  Having some richard and depression phases that are mild and would like to improve this.  Interested in MTM co-visit at next appointment    (F33.1) Moderate episode of recurrent major depressive disorder (H)  Comment:   Plan: MED THERAPY MANAGE REFERRAL            (F41.1) TODD (generalized anxiety disorder)  Comment:   Plan: MED THERAPY " MANAGE REFERRAL            (Z11.3) Routine screening for STI (sexually transmitted infection)  Comment:   Plan: HIV Antigen Antibody Combo, Treponema Abs w         Reflex to RPR and Titer, NEISSERIA GONORRHOEA         PCR, CHLAMYDIA TRACHOMATIS PCR, CANCELED:         NEISSERIA GONORRHOEA PCR, CANCELED: CHLAMYDIA         TRACHOMATIS PCR              Patient Instructions   Continue Truvada and Lamictal at the same doses  We will plan on a co-visit with the pharmacist at your next appointment      JAC Javed [facility]

## 2018-05-02 ENCOUNTER — OFFICE VISIT (OUTPATIENT)
Dept: FAMILY MEDICINE | Facility: CLINIC | Age: 28
End: 2018-05-02
Payer: COMMERCIAL

## 2018-05-02 VITALS
HEART RATE: 100 BPM | SYSTOLIC BLOOD PRESSURE: 132 MMHG | WEIGHT: 221.8 LBS | BODY MASS INDEX: 32.75 KG/M2 | TEMPERATURE: 97.9 F | DIASTOLIC BLOOD PRESSURE: 86 MMHG | OXYGEN SATURATION: 96 %

## 2018-05-02 DIAGNOSIS — Z11.3 ROUTINE SCREENING FOR STI (SEXUALLY TRANSMITTED INFECTION): ICD-10-CM

## 2018-05-02 DIAGNOSIS — F41.1 GAD (GENERALIZED ANXIETY DISORDER): ICD-10-CM

## 2018-05-02 DIAGNOSIS — F31.81 BIPOLAR 2 DISORDER (H): ICD-10-CM

## 2018-05-02 DIAGNOSIS — F33.1 MODERATE EPISODE OF RECURRENT MAJOR DEPRESSIVE DISORDER (H): ICD-10-CM

## 2018-05-02 DIAGNOSIS — Z79.899 ENCOUNTER FOR LONG-TERM (CURRENT) USE OF MEDICATIONS: Primary | ICD-10-CM

## 2018-05-02 LAB
ALBUMIN SERPL-MCNC: 4.3 G/DL (ref 3.4–5)
ALP SERPL-CCNC: 82 U/L (ref 40–150)
ALT SERPL W P-5'-P-CCNC: 123 U/L (ref 0–70)
ANION GAP SERPL CALCULATED.3IONS-SCNC: 9 MMOL/L (ref 3–14)
AST SERPL W P-5'-P-CCNC: 144 U/L (ref 0–45)
BASOPHILS # BLD AUTO: 0 10E9/L (ref 0–0.2)
BASOPHILS NFR BLD AUTO: 0.3 %
BILIRUB SERPL-MCNC: 0.4 MG/DL (ref 0.2–1.3)
BUN SERPL-MCNC: 12 MG/DL (ref 7–30)
CALCIUM SERPL-MCNC: 9.4 MG/DL (ref 8.5–10.1)
CHLORIDE SERPL-SCNC: 107 MMOL/L (ref 94–109)
CO2 SERPL-SCNC: 24 MMOL/L (ref 20–32)
CREAT SERPL-MCNC: 0.71 MG/DL (ref 0.66–1.25)
DIFFERENTIAL METHOD BLD: NORMAL
EOSINOPHIL # BLD AUTO: 0.2 10E9/L (ref 0–0.7)
EOSINOPHIL NFR BLD AUTO: 2.8 %
ERYTHROCYTE [DISTWIDTH] IN BLOOD BY AUTOMATED COUNT: 12.4 % (ref 10–15)
GFR SERPL CREATININE-BSD FRML MDRD: >90 ML/MIN/1.7M2
GLUCOSE SERPL-MCNC: 96 MG/DL (ref 70–99)
HCT VFR BLD AUTO: 49.5 % (ref 40–53)
HGB BLD-MCNC: 16.9 G/DL (ref 13.3–17.7)
LYMPHOCYTES # BLD AUTO: 1.9 10E9/L (ref 0.8–5.3)
LYMPHOCYTES NFR BLD AUTO: 33.5 %
MCH RBC QN AUTO: 32.8 PG (ref 26.5–33)
MCHC RBC AUTO-ENTMCNC: 34.1 G/DL (ref 31.5–36.5)
MCV RBC AUTO: 96 FL (ref 78–100)
MONOCYTES # BLD AUTO: 0.5 10E9/L (ref 0–1.3)
MONOCYTES NFR BLD AUTO: 8.3 %
NEUTROPHILS # BLD AUTO: 3.2 10E9/L (ref 1.6–8.3)
NEUTROPHILS NFR BLD AUTO: 55.1 %
PLATELET # BLD AUTO: 189 10E9/L (ref 150–450)
POTASSIUM SERPL-SCNC: 4 MMOL/L (ref 3.4–5.3)
PROT SERPL-MCNC: 8.2 G/DL (ref 6.8–8.8)
RBC # BLD AUTO: 5.15 10E12/L (ref 4.4–5.9)
SODIUM SERPL-SCNC: 140 MMOL/L (ref 133–144)
WBC # BLD AUTO: 5.8 10E9/L (ref 4–11)

## 2018-05-02 PROCEDURE — 99214 OFFICE O/P EST MOD 30 MIN: CPT | Performed by: NURSE PRACTITIONER

## 2018-05-02 PROCEDURE — 36415 COLL VENOUS BLD VENIPUNCTURE: CPT | Performed by: NURSE PRACTITIONER

## 2018-05-02 PROCEDURE — 85025 COMPLETE CBC W/AUTO DIFF WBC: CPT | Performed by: NURSE PRACTITIONER

## 2018-05-02 PROCEDURE — 86780 TREPONEMA PALLIDUM: CPT | Performed by: NURSE PRACTITIONER

## 2018-05-02 PROCEDURE — 80053 COMPREHEN METABOLIC PANEL: CPT | Performed by: NURSE PRACTITIONER

## 2018-05-02 PROCEDURE — 87389 HIV-1 AG W/HIV-1&-2 AB AG IA: CPT | Performed by: NURSE PRACTITIONER

## 2018-05-02 NOTE — PATIENT INSTRUCTIONS
Continue Truvada and Lamictal at the same doses  We will plan on a co-visit with the pharmacist at your next appointment

## 2018-05-02 NOTE — MR AVS SNAPSHOT
After Visit Summary   5/2/2018    Ambrosio Kemp    MRN: [id number]           Patient Information     Date Of Birth          1990        Visit Information        Provider Department      5/2/2018 10:15 AM Rin Mena APRN [facility]        Today's Diagnoses     Encounter for long-term (current) use of medications    -  1    Bipolar 2 disorder (H)        Moderate episode of recurrent major depressive disorder (H)        TODD (generalized anxiety disorder)        Routine screening for STI (sexually transmitted infection)          Care Instructions    Continue Truvada and Lamictal at the same doses  We will plan on a co-visit with the pharmacist at your next appointment          Follow-ups after your visit        Additional Services     MED THERAPY MANAGE REFERRAL       Your provider has referred you to: **[facility] (numerous locations) [phone]   [email or web address]  [facility]: [facility] [phone]   [email or web address]    Arleen Garcia    Reason for Referral: bipolar 2 optimization - doing ok on Lamictal 200 mg, but still having some short richard and depression phases.  Interested in co-visit at next appt in three months.    The [facility] department will contact you to schedule an appointment.  You may also schedule the appointment by calling [phone].  For [facility] [place] patients, please call [phone] to confirm/schedule your appointment on the next business day.    This service is designed to help you get the most from your medications.  A specially trained Pharmacist will work closely with you and your providers to solve any questions, concerns, issues or problems related to your medications.    Please bring all of your prescription and non-prescription medications (such as  vitamins, over-the-counter medications, and herbals) or a detailed medication list to your appointment.    If you have a glucose meter or other home monitoring information, please also bring this to your appointment (i.e. blood glucose log, blood pressure log, pain log, etc.).                  Your next 10 appointments already scheduled     Aug 01, 2018 10:15 AM CDT   Office Visit with JAC Mckinney CNP   [facility] ([facility])    [address] [place] [address] [phone]           Bring a current list of meds and any records pertaining to this visit. For Physicals, please bring immunization records and any forms needing to be filled out. Please arrive 10 minutes early to complete paperwork.              Who to contact     If you have questions or need follow up information about today's clinic visit or your schedule please contact [facility] directly at [phone].  Normal or non-critical lab and imaging results will be communicated to you by Ur[employer], letter or phone within 4 business days after the clinic has received the results. If you do not hear from us within 7 days, please contact the clinic through [employer] or phone. If you have a critical or abnormal lab result, we will notify you by phone as soon as possible.  Submit refill requests through [employer] or call your pharmacy and they will forward the refill request to us. Please allow 3 business days for your refill to be completed.          Additional Information About Your Visit        UrbasolarharArtaic Information     [employer] gives you secure access to your electronic health record. If you see a primary care provider, you can also send messages to your care team and make appointments. If you have questions, please call your primary care clinic.  If you do not have a primary care provider, please call [phone] and they will assist you.        Care EveryWhere ID     This is  your Care EveryWhere ID. This could be used by other organizations to access your [place] medical records  [id number]        Your Vitals Were     Pulse Temperature Pulse Oximetry BMI (Body Mass Index)          100 97.9  F (36.6  C) (Oral) 96% 32.75 kg/m2         Blood Pressure from Last 3 Encounters:   05/02/18 132/86   01/03/18 142/90   11/29/17 132/78    Weight from Last 3 Encounters:   05/02/18 221 lb 12.8 oz (100.6 kg)   01/03/18 220 lb 3.2 oz (99.9 kg)   11/29/17 222 lb (100.7 kg)              We Performed the Following     CBC with platelets differential     CHLAMYDIA TRACHOMATIS PCR     Comprehensive metabolic panel     HIV Antigen Antibody Combo     MED THERAPY MANAGE REFERRAL     NEISSERIA GONORRHOEA PCR     Treponema Abs w Reflex to RPR and Titer          Today's Medication Changes          These changes are accurate as of 5/2/18 10:54 AM.  If you have any questions, ask your nurse or doctor.               These medicines have changed or have updated prescriptions.        Dose/Directions    lamoTRIgine 200 MG tablet   Commonly known as:  LaMICtal   This may have changed:  Another medication with the same name was removed. Continue taking this medication, and follow the directions you see here.   Used for:  Bipolar 2 disorder (H), Moderate episode of recurrent major depressive disorder (H), TODD (generalized anxiety disorder)   Changed by:  Rin Mena APRN CNP        Dose:  200 mg   Take 1 tablet (200 mg) by mouth daily   Quantity:  90 tablet   Refills:  0                Primary Care Provider Office Phone # Fax #    JAC Mckinney [phone]       [facility] [address] [facility] [address]  [facility] [address]        Equal Access to Services     MILDRED RAMÍREZ : Hadii ino Patel, waaxda luqadaha, qaybta kaalmada adeegyada, curtis krueger. So [facility] [phone].    ATENCIÓN: Si habla español, tiene a galvan disposición servicios gratuitos de  asistencia lingüística. Shraddha al [phone].    We comply with applicable federal civil rights laws and [place] laws. We do not discriminate on the basis of race, color, national origin, age, disability, sex, sexual orientation, or gender identity.            Thank you!     Thank you for choosing [facility]  for your care. Our goal is always to provide you with excellent care. Hearing back from our patients is one way we can continue to improve our services. Please take a few minutes to complete the written survey that you may receive in the mail after your visit with us. Thank you!             Your Updated Medication List - Protect others around you: Learn how to safely use, store and throw away your medicines at www.disposemymeds.org.          This list is accurate as of 5/2/18 10:54 AM.  Always use your most recent med list.                   Brand Name Dispense Instructions for use Diagnosis    emtricitabine-tenofovir 200-300 MG per tablet    TRUVADA    90 tablet    Take 1 tablet by mouth daily due for office visit at end of each fill    Encounter for long-term (current) use of medications       lamoTRIgine 200 MG tablet    LaMICtal    90 tablet    Take 1 tablet (200 mg) by mouth daily    Bipolar 2 disorder (H), Moderate episode of recurrent major depressive disorder (H), TODD (generalized anxiety disorder)       LORazepam 1 MG tablet    ATIVAN    20 tablet    Take 1 tablet (1 mg) by mouth daily as needed for anxiety    TODD (generalized anxiety disorder)

## 2018-05-03 DIAGNOSIS — Z11.3 ROUTINE SCREENING FOR STI (SEXUALLY TRANSMITTED INFECTION): ICD-10-CM

## 2018-05-03 LAB
HIV 1+2 AB+HIV1 P24 AG SERPL QL IA: NONREACTIVE
T PALLIDUM AB SER QL: NONREACTIVE

## 2018-05-03 PROCEDURE — 87491 CHLMYD TRACH DNA AMP PROBE: CPT | Performed by: NURSE PRACTITIONER

## 2018-05-03 PROCEDURE — 87591 N.GONORRHOEAE DNA AMP PROB: CPT | Performed by: NURSE PRACTITIONER

## 2018-05-03 NOTE — PROGRESS NOTES
TRIAGE - see note below.  If patient doesn't respond today or tomorrow morning, please call him.  Thanks, Venessa Valente,    HIV and syphilis are negative.  Electrolytes and kidney look good.      There is a concerning increase in your liver chemistries.  This can happen for a number of reasons including alcohol and meds.    What is your current alcohol intake and do you take tylenol at all?      If you have any questions, please feel free to contact the clinic.    JERSEY Izquierdo

## 2018-05-04 LAB
C TRACH DNA SPEC QL NAA+PROBE: NEGATIVE
N GONORRHOEA DNA SPEC QL NAA+PROBE: NEGATIVE
SPECIMEN SOURCE: NORMAL
SPECIMEN SOURCE: NORMAL

## 2018-05-06 NOTE — PROGRESS NOTES
Ambrosio,    Your labs were all normal.  If you have any questions, please feel free to contact the clinic.    JERSEY Izquierdo

## 2018-05-09 ENCOUNTER — TELEPHONE (OUTPATIENT)
Dept: PHARMACY | Facility: OTHER | Age: 28
End: 2018-05-09

## 2018-05-09 NOTE — TELEPHONE ENCOUNTER
MTM referral from: [facility] visit (referral by provider)    MTM referral outreach attempt #1 on May 9, 2018 at 11:52 AM      Outcome: Patient is not reachable at this time and does not have other active contact info, will route to MTM Pharmacist/Provider as an FYI. Thank you for the referral.     Kat Barraza, MTM Coordinator

## 2018-05-09 NOTE — LETTER
[facility]  [address] [place], [place] [address]  [place] [address]  [phone]      Dear Venessa Valente has recommended you schedule a Medication Therapy Management (MTM) appointment. MTM is designed to help you get the most of out of your medicines.     During an MTM appointment a specially trained pharmacist will review all of your medicines, both prescription and over-the-counter. They will make sure your medicines are the best choice for you and are safe and convenient for you.  MTM pharmacists work together with you and your doctor to help you understand your medicines, solve any problems related to your medicines and help you get the best results from taking your medicines.     At [facility], we strongly believe in a team approach to health care. We want to help you understand your medicines and health conditions. To learn more about how you might benefit from MTM services, watch the patient video at [email or web address].     To make an appointment, please call the clinic at [phone] or the MTM scheduling line at [phone] (toll-free at [phone]).    We look forward to hearing from you!      Sincerely,      Isabelle Isaac, PharmD, BCACP  Clinical Pharmacy Specialist  Medication Therapy Management Practitioner

## 2018-07-09 DIAGNOSIS — F33.1 MODERATE EPISODE OF RECURRENT MAJOR DEPRESSIVE DISORDER (H): ICD-10-CM

## 2018-07-09 DIAGNOSIS — Z79.899 ENCOUNTER FOR LONG-TERM (CURRENT) USE OF MEDICATIONS: ICD-10-CM

## 2018-07-09 DIAGNOSIS — F31.81 BIPOLAR 2 DISORDER (H): ICD-10-CM

## 2018-07-09 DIAGNOSIS — F41.1 GAD (GENERALIZED ANXIETY DISORDER): ICD-10-CM

## 2018-07-09 NOTE — TELEPHONE ENCOUNTER
emtricitabine-tenofovir (TRUVADA) 200-300 MG per tablet       Last Written Prescription Date:  4-10-18  Last Fill Quantity: 90,   # refills: 0  Last Office Visit: 5-2-18  Future Office visit:    Next 5 appointments (look out 90 days)     Aug 01, 2018 10:15 AM CDT   Office Visit with JAC Mckinney CNP   [facility]    [address] [place] [address] [phone]                   Routing refill request to provider for review/approval because:  Drug not on the [facility], [facility] or [facility] refill protocol or controlled substance

## 2018-07-09 NOTE — TELEPHONE ENCOUNTER
Venessa--    Please review/sign or advise on refill for Lamictal 200 mg tablets. Patient has an upcoming appointment on 8/1/18    Thank you,   Radha Jeffries RN

## 2018-07-09 NOTE — TELEPHONE ENCOUNTER
"Requested Prescriptions   Pending Prescriptions Disp Refills     lamoTRIgine (LAMICTAL) 200 MG tablet    Last Written Prescription Date:  4-10-18  Last Fill Quantity: 90,  # refills: 0   Last office visit: 5/2/2018 with prescribing provider:  5-2-18   Future Office Visit:     Next 5 appointments (look out 90 days)     Aug 01, 2018 10:15 AM CDT   Office Visit with JAC Mckinney CNP   [facility]    [address] [place] [id number] [phone]                90 tablet 0     Sig: Take 1 tablet (200 mg) by mouth daily    Anti-Seizure Meds Protocol  Failed    7/9/2018 12:16 PM       Failed - Review Authorizing provider's last note.     Refer to last progress notes: confirm request is for original authorizing provider (cannot be through other providers).         Failed - Normal ALT or AST on file in past 26 months    Recent Labs   Lab Test  05/02/18   1057   ALT  123*     Recent Labs   Lab Test  05/02/18   1057   AST  144*            Passed - Recent (12 mo) or future (30 days) visit within the authorizing provider's specialty    Patient had office visit in the last 12 months or has a visit in the next 30 days with authorizing provider or within the authorizing provider's specialty.  See \"Patient Info\" tab in inbasket, or \"Choose Columns\" in Meds & Orders section of the refill encounter.           Passed - Normal CBC on file in past 26 months    Recent Labs   Lab Test  05/02/18   1057   WBC  5.8   RBC  5.15   HGB  16.9   HCT  49.5   PLT  189       For [facility] ONLY: [id number] = WBC, [id number] = RBC         Passed - Normal platelet count on file in past 26 months    Recent Labs   Lab Test  05/02/18   1057   PLT  189               "

## 2018-07-09 NOTE — TELEPHONE ENCOUNTER
Venessa--    Please review/sign or advise on refill for Truvada 200-300 mg. Last office visit 5/2/18    Thank you,   Radha Jeffries RN

## 2018-07-10 DIAGNOSIS — F33.1 MODERATE EPISODE OF RECURRENT MAJOR DEPRESSIVE DISORDER (H): ICD-10-CM

## 2018-07-10 DIAGNOSIS — F41.1 GAD (GENERALIZED ANXIETY DISORDER): ICD-10-CM

## 2018-07-10 DIAGNOSIS — F31.81 BIPOLAR 2 DISORDER (H): ICD-10-CM

## 2018-07-10 RX ORDER — LAMOTRIGINE 200 MG/1
200 TABLET ORAL DAILY
Qty: 90 TABLET | Refills: 0 | Status: CANCELLED | OUTPATIENT
Start: 2018-07-10

## 2018-07-10 NOTE — TELEPHONE ENCOUNTER
"Requested Prescriptions   Pending Prescriptions Disp Refills     lamoTRIgine (LAMICTAL) 200 MG tablet 90 tablet 0    Last Written Prescription Date:  04/10/2018  Last Fill Quantity: 90,  # refills: 0   Last office visit: 5/2/2018 with prescribing provider:  05/02/2018   Future Office Visit:   Next 5 appointments (look out 90 days)     Aug 01, 2018 10:15 AM CDT   Office Visit with JAC Mckinney CNP   [facility]    [address] [place] [address] [phone]                  Sig: Take 1 tablet (200 mg) by mouth daily    Anti-Seizure Meds Protocol  Failed    7/10/2018  4:10 PM       Failed - Review Authorizing provider's last note.     Refer to last progress notes: confirm request is for original authorizing provider (cannot be through other providers).         Failed - Normal ALT or AST on file in past 26 months    Recent Labs   Lab Test  05/02/18   1057   ALT  123*     Recent Labs   Lab Test  05/02/18   1057   AST  144*            Passed - Recent (12 mo) or future (30 days) visit within the authorizing provider's specialty    Patient had office visit in the last 12 months or has a visit in the next 30 days with authorizing provider or within the authorizing provider's specialty.  See \"Patient Info\" tab in inbasket, or \"Choose Columns\" in Meds & Orders section of the refill encounter.           Passed - Normal CBC on file in past 26 months    Recent Labs   Lab Test  05/02/18   1057   WBC  5.8   RBC  5.15   HGB  16.9   HCT  49.5   PLT  189       For [facility] ONLY: [id number] = WBC, [id number] = RBC         Passed - Normal platelet count on file in past 26 months    Recent Labs   Lab Test  05/02/18   1057   PLT  189                 "

## 2018-07-11 RX ORDER — LAMOTRIGINE 200 MG/1
200 TABLET ORAL DAILY
Qty: 30 TABLET | Refills: 0 | Status: SHIPPED | OUTPATIENT
Start: 2018-07-11 | End: 2018-08-01

## 2018-07-11 RX ORDER — DEXAMETHASONE 0.75 MG/1
TABLET ORAL
Qty: 30 TABLET | Refills: 0 | Status: SHIPPED | OUTPATIENT
Start: 2018-07-11 | End: 2018-08-01

## 2018-07-11 NOTE — TELEPHONE ENCOUNTER
Duplicate request. See TE 07/09/18.    Closing encounter.    Fawn Alvarez RN  [facility]

## 2018-07-11 NOTE — TELEPHONE ENCOUNTER
Venessa,     Pt called clinic, stated that he is out of Lamictal and has 10 tablets left of Truvada.    Fawn Alvarez RN  [facility]

## 2018-07-11 NOTE — TELEPHONE ENCOUNTER
Does patient have enough to last to his appointment (BOTH TRUVADA and LAMICTAL)?  I think this may have been pharmacy generated and I would rather fill 3 months at a time at his appointment rather than #30 today and then be off schedule.  CRIS Mena, HANNAHP

## 2018-07-11 NOTE — TELEPHONE ENCOUNTER
vm left for pt, scripts have been sent to pharm for both refill request    Tiffany Frost RN   [facility]

## 2018-08-01 ENCOUNTER — OFFICE VISIT (OUTPATIENT)
Dept: FAMILY MEDICINE | Facility: CLINIC | Age: 28
End: 2018-08-01
Payer: COMMERCIAL

## 2018-08-01 VITALS
WEIGHT: 225 LBS | TEMPERATURE: 98.6 F | DIASTOLIC BLOOD PRESSURE: 86 MMHG | BODY MASS INDEX: 33.23 KG/M2 | SYSTOLIC BLOOD PRESSURE: 136 MMHG | OXYGEN SATURATION: 96 % | HEART RATE: 110 BPM

## 2018-08-01 DIAGNOSIS — J35.1 TONSILLAR HYPERTROPHY: ICD-10-CM

## 2018-08-01 DIAGNOSIS — Z11.3 ROUTINE SCREENING FOR STI (SEXUALLY TRANSMITTED INFECTION): ICD-10-CM

## 2018-08-01 DIAGNOSIS — J35.8 TONSILLAR EXUDATE: ICD-10-CM

## 2018-08-01 DIAGNOSIS — Z79.899 ENCOUNTER FOR LONG-TERM (CURRENT) USE OF MEDICATIONS: Primary | ICD-10-CM

## 2018-08-01 DIAGNOSIS — F33.1 MODERATE EPISODE OF RECURRENT MAJOR DEPRESSIVE DISORDER (H): ICD-10-CM

## 2018-08-01 DIAGNOSIS — F41.1 GAD (GENERALIZED ANXIETY DISORDER): ICD-10-CM

## 2018-08-01 DIAGNOSIS — R79.89 ELEVATED LIVER FUNCTION TESTS: ICD-10-CM

## 2018-08-01 DIAGNOSIS — J03.01 ACUTE RECURRENT STREPTOCOCCAL TONSILLITIS: ICD-10-CM

## 2018-08-01 DIAGNOSIS — F31.81 BIPOLAR 2 DISORDER (H): ICD-10-CM

## 2018-08-01 LAB
ALBUMIN SERPL-MCNC: 4.2 G/DL (ref 3.4–5)
ALP SERPL-CCNC: 103 U/L (ref 40–150)
ALT SERPL W P-5'-P-CCNC: 84 U/L (ref 0–70)
ANION GAP SERPL CALCULATED.3IONS-SCNC: 7 MMOL/L (ref 3–14)
AST SERPL W P-5'-P-CCNC: 62 U/L (ref 0–45)
BILIRUB SERPL-MCNC: 1 MG/DL (ref 0.2–1.3)
BUN SERPL-MCNC: 11 MG/DL (ref 7–30)
CALCIUM SERPL-MCNC: 9.1 MG/DL (ref 8.5–10.1)
CHLORIDE SERPL-SCNC: 103 MMOL/L (ref 94–109)
CO2 SERPL-SCNC: 29 MMOL/L (ref 20–32)
CREAT SERPL-MCNC: 0.74 MG/DL (ref 0.66–1.25)
DEPRECATED S PYO AG THROAT QL EIA: ABNORMAL
GFR SERPL CREATININE-BSD FRML MDRD: >90 ML/MIN/1.7M2
GLUCOSE SERPL-MCNC: 83 MG/DL (ref 70–99)
POTASSIUM SERPL-SCNC: 4.1 MMOL/L (ref 3.4–5.3)
PROT SERPL-MCNC: 7.8 G/DL (ref 6.8–8.8)
SODIUM SERPL-SCNC: 139 MMOL/L (ref 133–144)
SPECIMEN SOURCE: ABNORMAL

## 2018-08-01 PROCEDURE — 86706 HEP B SURFACE ANTIBODY: CPT | Performed by: NURSE PRACTITIONER

## 2018-08-01 PROCEDURE — 87591 N.GONORRHOEAE DNA AMP PROB: CPT | Performed by: NURSE PRACTITIONER

## 2018-08-01 PROCEDURE — 80053 COMPREHEN METABOLIC PANEL: CPT | Performed by: NURSE PRACTITIONER

## 2018-08-01 PROCEDURE — 99214 OFFICE O/P EST MOD 30 MIN: CPT | Performed by: NURSE PRACTITIONER

## 2018-08-01 PROCEDURE — 87389 HIV-1 AG W/HIV-1&-2 AB AG IA: CPT | Performed by: NURSE PRACTITIONER

## 2018-08-01 PROCEDURE — 87491 CHLMYD TRACH DNA AMP PROBE: CPT | Performed by: NURSE PRACTITIONER

## 2018-08-01 PROCEDURE — 86704 HEP B CORE ANTIBODY TOTAL: CPT | Performed by: NURSE PRACTITIONER

## 2018-08-01 PROCEDURE — 87880 STREP A ASSAY W/OPTIC: CPT | Performed by: NURSE PRACTITIONER

## 2018-08-01 PROCEDURE — 36415 COLL VENOUS BLD VENIPUNCTURE: CPT | Performed by: NURSE PRACTITIONER

## 2018-08-01 PROCEDURE — 86780 TREPONEMA PALLIDUM: CPT | Performed by: NURSE PRACTITIONER

## 2018-08-01 PROCEDURE — 87340 HEPATITIS B SURFACE AG IA: CPT | Performed by: NURSE PRACTITIONER

## 2018-08-01 RX ORDER — LAMOTRIGINE 200 MG/1
200 TABLET ORAL DAILY
Qty: 90 TABLET | Refills: 1 | Status: SHIPPED | OUTPATIENT
Start: 2018-08-01 | End: 2018-10-31

## 2018-08-01 RX ORDER — LORAZEPAM 1 MG/1
1 TABLET ORAL DAILY PRN
Qty: 20 TABLET | Refills: 0 | Status: SHIPPED | OUTPATIENT
Start: 2018-08-01 | End: 2019-11-20

## 2018-08-01 RX ORDER — EMTRICITABINE AND TENOFOVIR DISOPROXIL FUMARATE 200; 300 MG/1; MG/1
1 TABLET, FILM COATED ORAL DAILY
Qty: 90 TABLET | Refills: 0 | Status: SHIPPED | OUTPATIENT
Start: 2018-08-01 | End: 2019-11-20

## 2018-08-01 RX ORDER — PENICILLIN V POTASSIUM 500 MG/1
500 TABLET, FILM COATED ORAL 2 TIMES DAILY
Qty: 20 TABLET | Refills: 0 | Status: SHIPPED | OUTPATIENT
Start: 2018-08-01 | End: 2018-10-31

## 2018-08-01 ASSESSMENT — ANXIETY QUESTIONNAIRES
5. BEING SO RESTLESS THAT IT IS HARD TO SIT STILL: SEVERAL DAYS
6. BECOMING EASILY ANNOYED OR IRRITABLE: NOT AT ALL
7. FEELING AFRAID AS IF SOMETHING AWFUL MIGHT HAPPEN: SEVERAL DAYS
1. FEELING NERVOUS, ANXIOUS, OR ON EDGE: NOT AT ALL
2. NOT BEING ABLE TO STOP OR CONTROL WORRYING: NOT AT ALL
IF YOU CHECKED OFF ANY PROBLEMS ON THIS QUESTIONNAIRE, HOW DIFFICULT HAVE THESE PROBLEMS MADE IT FOR YOU TO DO YOUR WORK, TAKE CARE OF THINGS AT HOME, OR GET ALONG WITH OTHER PEOPLE: SOMEWHAT DIFFICULT
GAD7 TOTAL SCORE: 4
3. WORRYING TOO MUCH ABOUT DIFFERENT THINGS: SEVERAL DAYS

## 2018-08-01 ASSESSMENT — PATIENT HEALTH QUESTIONNAIRE - PHQ9: 5. POOR APPETITE OR OVEREATING: SEVERAL DAYS

## 2018-08-01 NOTE — MR AVS SNAPSHOT
After Visit Summary   8/1/2018    Ambrosio Kemp    MRN: [id number]           Patient Information     Date Of Birth          1990        Visit Information        Provider Department      8/1/2018 10:15 AM Rin Mena APRN CNP [facility]        Today's Diagnoses     Encounter for long-term (current) use of medications    -  1    Bipolar 2 disorder (H)        Moderate episode of recurrent major depressive disorder (H)        TODD (generalized anxiety disorder)        Routine screening for STI (sexually transmitted infection)        Elevated liver function tests        Tonsillar hypertrophy        Tonsillar exudate          Care Instructions    Today check HIV, syphilis as well as genitourinary chlamydia and gonorrhea  We will test strep before the oral gonorrhea and chlamydia    We will follow-up on the elevated liver tests and also confirm that you have hep B immunity  I will check your kidney function     Return tomorrow for throat swab if the strep is negative          Follow-ups after your visit        Who to contact     If you have questions or need follow up information about today's clinic visit or your schedule please contact [facility] directly at [phone].  Normal or non-critical lab and imaging results will be communicated to you by MyChart, letter or phone within 4 business days after the clinic has received the results. If you do not hear from us within 7 days, please contact the clinic through [employer] or phone. If you have a critical or abnormal lab result, we will notify you by phone as soon as possible.  Submit refill requests through [employer] or call your pharmacy and they will forward the refill request to us. Please allow 3 business days for your refill to be completed.          Additional Information About Your Visit        MyChart Information     [employer] gives you secure access to your electronic health record. If you see a primary care  provider, you can also send messages to your care team and make appointments. If you have questions, please call your primary care clinic.  If you do not have a primary care provider, please call [phone] and they will assist you.        Care EveryWhere ID     This is your Care EveryWhere ID. This could be used by other organizations to access your [facility] medical records  [id number]        Your Vitals Were     Pulse Temperature Pulse Oximetry BMI (Body Mass Index)          110 98.6  F (37  C) (Oral) 96% 33.23 kg/m2         Blood Pressure from Last 3 Encounters:   08/01/18 136/86   05/02/18 132/86   01/03/18 142/90    Weight from Last 3 Encounters:   08/01/18 225 lb (102.1 kg)   05/02/18 221 lb 12.8 oz (100.6 kg)   01/03/18 220 lb 3.2 oz (99.9 kg)              We Performed the Following     CHLAMYDIA TRACHOMATIS PCR     Comprehensive metabolic panel     Hepatitis B core antibody     Hepatitis B Surface Antibody     Hepatitis B surface antigen     HIV Antigen Antibody Combo     NEISSERIA GONORRHOEA PCR     Rapid strep screen     Treponema Abs w Reflex to RPR and Titer          Today's Medication Changes          These changes are accurate as of 8/1/18 10:49 AM.  If you have any questions, ask your nurse or doctor.               These medicines have changed or have updated prescriptions.        Dose/Directions    emtricitabine-tenofovir 200-300 MG per tablet   Commonly known as:  TRUVADA   This may have changed:  See the new instructions.   Used for:  Encounter for long-term (current) use of medications   Changed by:  Rin eMna, APRN CNP        Dose:  1 tablet   Take 1 tablet by mouth daily   Quantity:  90 tablet   Refills:  0            Where to get your medicines      These medications were sent to [facility]/pharmacy #[id number] - [place], [place] - [address], [place] [address]     Phone:  [phone]     emtricitabine-tenofovir 200-300 MG per tablet    lamoTRIgine 200 MG tablet          Some of these will need a paper prescription and others can be bought over the counter.  Ask your nurse if you have questions.     Bring a paper prescription for each of these medications     LORazepam 1 MG tablet                Primary Care Provider Office Phone # Fax #    JAC Mckinney [facility] [phone]       [address]  [facility] [address]        Equal Access to Services     MARTY RAMÍREZ : Hadii aad ku hadasho Soomaali, waaxda luqadaha, qaybta kaalmada adeegyada, waxay idiin hayaan adeeg kharash la'aan . So [facility] [phone].    ATENCIÓN: Si habla español, tiene a galvan disposición servicios gratuitos de asistencia lingüística. Shraddha al [phone].    We comply with applicable federal civil rights laws and [place] laws. We do not discriminate on the basis of race, color, national origin, age, disability, sex, sexual orientation, or gender identity.            Thank you!     Thank you for choosing [facility]  for your care. Our goal is always to provide you with excellent care. Hearing back from our patients is one way we can continue to improve our services. Please take a few minutes to complete the written survey that you may receive in the mail after your visit with us. Thank you!             Your Updated Medication List - Protect others around you: Learn how to safely use, store and throw away your medicines at www.disposemymeds.org.          This list is accurate as of 8/1/18 10:49 AM.  Always use your most recent med list.                   Brand Name Dispense Instructions for use Diagnosis    emtricitabine-tenofovir 200-300 MG per tablet    TRUVADA    90 tablet    Take 1 tablet by mouth daily    Encounter for long-term (current) use of medications       lamoTRIgine 200 MG tablet    LaMICtal    90 tablet    Take 1 tablet (200 mg) by mouth daily    Bipolar 2 disorder (H), Moderate episode of recurrent major depressive disorder (H), TODD (generalized  anxiety disorder)       LORazepam 1 MG tablet    ATIVAN    20 tablet    Take 1 tablet (1 mg) by mouth daily as needed for anxiety    TODD (generalized anxiety disorder)

## 2018-08-02 LAB
C TRACH DNA SPEC QL NAA+PROBE: NEGATIVE
HBV CORE AB SERPL QL IA: NONREACTIVE
HBV SURFACE AB SERPL IA-ACNC: >1000 M[IU]/ML
HBV SURFACE AG SERPL QL IA: NONREACTIVE
HIV 1+2 AB+HIV1 P24 AG SERPL QL IA: NONREACTIVE
N GONORRHOEA DNA SPEC QL NAA+PROBE: NEGATIVE
SPECIMEN SOURCE: NORMAL
SPECIMEN SOURCE: NORMAL
T PALLIDUM AB SER QL: NONREACTIVE

## 2018-08-02 ASSESSMENT — PATIENT HEALTH QUESTIONNAIRE - PHQ9: SUM OF ALL RESPONSES TO PHQ QUESTIONS 1-9: 4

## 2018-08-02 ASSESSMENT — ANXIETY QUESTIONNAIRES: GAD7 TOTAL SCORE: 4

## 2018-08-03 NOTE — PROGRESS NOTES
Ambrosio,    Your labs were all normal with the exception of the positive strep and elevated liver function tests.  The liver function tests have decreased since last check so we can measure these again in three months and I would except them to have normalized.  In the meantime, avoid alcohol and tylenol.  The Hep B tests showed that you have immunity.  If you have any questions, please feel free to contact the clinic.    JERSEY Izquierdo

## 2018-08-06 ENCOUNTER — TELEPHONE (OUTPATIENT)
Dept: PHARMACY | Facility: OTHER | Age: 28
End: 2018-08-06

## 2018-08-06 NOTE — TELEPHONE ENCOUNTER
MTM referral from: [facility] visit (referral by provider)    MTM referral outreach attempt #2 on August 6, 2018 at 3:27 PM      Outcome: Patient not reachable after several attempts, will route to MTM Pharmacist/Provider as an FYI. Thank you for the referral.    Kat Barraza, MTM Coordinator

## 2018-08-06 NOTE — LETTER
[facility] [place] [facility]  [address] [place], [place] [address]  [place] [address]  [phone]      Dear Venessa Valente has recommended you schedule a Medication Therapy Management (MTM) appointment. MTM is designed to help you get the most of out of your medicines.     During an MTM appointment a specially trained pharmacist will review all of your medicines, both prescription and over-the-counter. They will make sure your medicines are the best choice for you and are safe and convenient for you.  MTM pharmacists work together with you and your doctor to help you understand your medicines, solve any problems related to your medicines and help you get the best results from taking your medicines.     At [facility], we strongly believe in a team approach to health care. We want to help you understand your medicines and health conditions. To learn more about how you might benefit from MTM services, watch the patient video at [email or web address].     To make an appointment, please call the clinic at [phone] or the MTM scheduling line at [phone] (toll-free at [phone]).    We look forward to hearing from you!      Sincerely,      Isabelle Isaac, PharmD, BCACP  Medication Management Pharmacist

## 2018-08-22 ENCOUNTER — MYC MEDICAL ADVICE (OUTPATIENT)
Dept: FAMILY MEDICINE | Facility: CLINIC | Age: 28
End: 2018-08-22

## 2018-08-22 NOTE — TELEPHONE ENCOUNTER
Could you call and Mann Valente regarding meeting with Crystal Garcia, psych MTM.  The  tried to call him but did not make contact.  He can have the number to call himself.  JERSEY Izquierdo

## 2018-10-23 NOTE — PROGRESS NOTES
"  SUBJECTIVE:   Ambrosio Kemp is a 27 year old male who presents to clinic today for the following health issues:    Depression and Anxiety Follow-Up    Status since last visit: { :582577::\"No change\"}    Other associated symptoms:{ :971343::\"None\"}    Complicating factors:     Significant life event: { :884580::\"No\"}     Current substance abuse: { :745547::\"None\"}    PHQ 9/27/2017 1/3/2018 8/1/2018   PHQ-9 Total Score 7 4 4   Q9: Suicide Ideation Not at all Not at all Not at all     TODD-7 SCORE 9/27/2017 1/3/2018 8/1/2018   Total Score 6 4 4     {PROVIDER ONLY Complete follow-up questions for patients who report suicide ideation  (Optional):237957}  PHQ-9  English  PHQ-9   Any Language  TODD-7  Suicide Assessment Five-step Evaluation and Treatment (SAFE-T)    Amount of exercise or physical activity: {Exercise frequency days per week:667468}    Problems taking medications regularly: {Med Problems:535025::\"No\"}    Medication side effects: {CHRONIC MED SIDE EFFECTS:860562::\"none\"}    Diet: { :931784}        {additional problems for provider to add:939727}    Problem list and histories reviewed & adjusted, as indicated.  Additional history: {NONE - AS DOCUMENTED:425622::\"as documented\"}    {HIST REVIEW/ LINKS 2:540023}    Reviewed and updated as needed this visit by clinical staff       Reviewed and updated as needed this visit by Provider         {PROVIDER CHARTING PREFERENCE:348168}    "

## 2018-10-24 ENCOUNTER — OFFICE VISIT (OUTPATIENT)
Dept: FAMILY MEDICINE | Facility: CLINIC | Age: 28
End: 2018-10-24
Payer: COMMERCIAL

## 2018-10-24 DIAGNOSIS — Z53.9 ERRONEOUS ENCOUNTER--DISREGARD: Primary | ICD-10-CM

## 2018-10-24 PROCEDURE — 99207 ZZC NO SHOW FOR SCHEDULED APPT: CPT | Performed by: NURSE PRACTITIONER

## 2018-10-24 NOTE — MR AVS SNAPSHOT
After Visit Summary   10/24/2018    Ambrosio Kemp    MRN: [id number]           Patient Information     Date Of Birth          1990        Today's Diagnoses     ERRONEOUS ENCOUNTER--DISREGARD    -  1       Follow-ups after your visit        Who to contact     If you have questions or need follow up information about today's clinic visit or your schedule please contact [facility] directly at [phone].  Normal or non-critical lab and imaging results will be communicated to you by [employer], letter or phone within 4 business days after the clinic has received the results. If you do not hear from us within 7 days, please contact the clinic through [employer]Rxhart or phone. If you have a critical or abnormal lab result, we will notify you by phone as soon as possible.  Submit refill requests through [employer] [place] or call your pharmacy and they will forward the refill request to us. Please allow 3 business days for your refill to be completed.          Additional Information About Your Visit        MyChart Information     [employer] [place] gives you secure access to your electronic health record. If you see a primary care provider, you can also send messages to your care team and make appointments. If you have questions, please call your primary care clinic.  If you do not have a primary care provider, please call [phone] and they will assist you.        Care EveryWhere ID     This is your Care EveryWhere ID. This could be used by other organizations to access your [facility] medical records  [id number]         Blood Pressure from Last 3 Encounters:   10/31/18 136/88   08/01/18 136/86   05/02/18 132/86    Weight from Last 3 Encounters:   10/31/18 224 lb 12.8 oz (102 kg)   08/01/18 225 lb (102.1 kg)   05/02/18 221 lb 12.8 oz (100.6 kg)              Today, you had the following     No orders found for display       Primary Care Provider Office Phone # Fax #    JAC Mckinney [phone]  [phone]       [address]  [facility] [address]        Equal Access to Services     MARTY RAMÍREZ : Hadii aad ku hadaliso Soaudra, wabrooklynda luqadaha, qasagarta kaalmada twanthom, curtis raymonin hayaajuan martelmikhailotilia krueger. So [facility] [phone].    ATENCIÓN: Si habla español, tiene a galvan disposición servicios gratuitos de asistencia lingüística. Llame al [phone].    We comply with applicable federal civil rights laws and [place] laws. We do not discriminate on the basis of race, color, national origin, age, disability, sex, sexual orientation, or gender identity.            Thank you!     Thank you for choosing [facility]  for your care. Our goal is always to provide you with excellent care. Hearing back from our patients is one way we can continue to improve our services. Please take a few minutes to complete the written survey that you may receive in the mail after your visit with us. Thank you!             Your Updated Medication List - Protect others around you: Learn how to safely use, store and throw away your medicines at www.disposemymeds.org.          This list is accurate as of 10/24/18 11:59 PM.  Always use your most recent med list.                   Brand Name Dispense Instructions for use Diagnosis    emtricitabine-tenofovir 200-300 MG per tablet    TRUVADA    90 tablet    Take 1 tablet by mouth daily    Encounter for long-term (current) use of medications       LORazepam 1 MG tablet    ATIVAN    20 tablet    Take 1 tablet (1 mg) by mouth daily as needed for anxiety    TODD (generalized anxiety disorder)       penicillin V 500 MG tablet    VEETID    20 tablet    Take 1 tablet (500 mg) by mouth 2 times daily    Acute recurrent streptococcal tonsillitis

## 2018-10-31 ENCOUNTER — OFFICE VISIT (OUTPATIENT)
Dept: FAMILY MEDICINE | Facility: CLINIC | Age: 28
End: 2018-10-31
Payer: COMMERCIAL

## 2018-10-31 VITALS
SYSTOLIC BLOOD PRESSURE: 136 MMHG | DIASTOLIC BLOOD PRESSURE: 88 MMHG | HEART RATE: 100 BPM | TEMPERATURE: 98 F | OXYGEN SATURATION: 96 % | BODY MASS INDEX: 33.2 KG/M2 | WEIGHT: 224.8 LBS

## 2018-10-31 DIAGNOSIS — F31.81 BIPOLAR 2 DISORDER (H): Primary | ICD-10-CM

## 2018-10-31 DIAGNOSIS — F33.1 MODERATE EPISODE OF RECURRENT MAJOR DEPRESSIVE DISORDER (H): ICD-10-CM

## 2018-10-31 DIAGNOSIS — F41.1 GAD (GENERALIZED ANXIETY DISORDER): ICD-10-CM

## 2018-10-31 DIAGNOSIS — R79.89 ELEVATED LFTS: ICD-10-CM

## 2018-10-31 PROCEDURE — 36415 COLL VENOUS BLD VENIPUNCTURE: CPT | Performed by: NURSE PRACTITIONER

## 2018-10-31 PROCEDURE — 80076 HEPATIC FUNCTION PANEL: CPT | Performed by: NURSE PRACTITIONER

## 2018-10-31 PROCEDURE — 99214 OFFICE O/P EST MOD 30 MIN: CPT | Performed by: NURSE PRACTITIONER

## 2018-10-31 RX ORDER — LAMOTRIGINE 200 MG/1
200 TABLET ORAL DAILY
Qty: 90 TABLET | Refills: 1 | Status: SHIPPED | OUTPATIENT
Start: 2018-10-31 | End: 2019-01-19

## 2018-10-31 NOTE — MR AVS SNAPSHOT
After Visit Summary   10/31/2018    Ambrosio Kemp    MRN: [id number]           Patient Information     Date Of Birth          1990        Visit Information        Provider Department      10/31/2018 1:00 PM Rin Mena APRN CNP [facility]        Today's Diagnoses     Bipolar 2 disorder (H)    -  1    Moderate episode of recurrent major depressive disorder (H)        TODD (generalized anxiety disorder)        Elevated LFTs           Follow-ups after your visit        Follow-up notes from your care team     Return in about 6 months (around 4/30/2019) for depression.      Who to contact     If you have questions or need follow up information about today's clinic visit or your schedule please contact [facility] directly at [phone].  Normal or non-critical lab and imaging results will be communicated to you by [employer], letter or phone within 4 business days after the clinic has received the results. If you do not hear from us within 7 days, please contact the clinic through [employer] or phone. If you have a critical or abnormal lab result, we will notify you by phone as soon as possible.  Submit refill requests through [employer] or call your pharmacy and they will forward the refill request to us. Please allow 3 business days for your refill to be completed.          Additional Information About Your Visit        MyChart Information     [employer] gives you secure access to your electronic health record. If you see a primary care provider, you can also send messages to your care team and make appointments. If you have questions, please call your primary care clinic.  If you do not have a primary care provider, please call [phone] and they will assist you.        Care EveryWhere ID     This is your Care EveryWhere ID. This could be used by other organizations to access your [facility] medical records  [id number]        Your Vitals Were     Pulse Temperature Pulse  Oximetry BMI (Body Mass Index)          100 98  F (36.7  C) (Oral) 96% 33.2 kg/m2         Blood Pressure from Last 3 Encounters:   10/31/18 136/88   08/01/18 136/86   05/02/18 132/86    Weight from Last 3 Encounters:   10/31/18 224 lb 12.8 oz (102 kg)   08/01/18 225 lb (102.1 kg)   05/02/18 221 lb 12.8 oz (100.6 kg)              We Performed the Following     DEPRESSION ACTION PLAN (DAP)     Hepatic panel          Today's Medication Changes          These changes are accurate as of 10/31/18 11:59 PM.  If you have any questions, ask your nurse or doctor.               Stop taking these medicines if you haven't already. Please contact your care team if you have questions.     penicillin V potassium 500 MG tablet   Commonly known as:  VEETID   Stopped by:  Rin Mena APRN CNP                Where to get your medicines      These medications were sent to [facility]/pharmacy #[id number] - [place], [place] - [address], [place] [address]     Phone:  [phone]     lamoTRIgine 200 MG tablet                Primary Care Provider Office Phone # Fax #    JAC Mckinney [phone]       [address] [address]        Equal Access to Services     [facility] UCLACHERYLE : Hadii ino ku hadasho Sosheelaali, waaxda luqadaha, qaybta kaalmada adeegyada, waxshital smallwood hayliliana pulido . So [facility] [phone].    ATENCIÓN: Si habla español, tiene a galvan disposición servicios gratuitos de asistencia lingüística. Llame al [phone].    We comply with applicable federal civil rights laws and [place] laws. We do not discriminate on the basis of race, color, national origin, age, disability, sex, sexual orientation, or gender identity.            Thank you!     Thank you for choosing [facility]  for your care. Our goal is always to provide you with excellent care. Hearing back from our patients is one way we can continue to improve our services. Please  take a few minutes to complete the written survey that you may receive in the mail after your visit with us. Thank you!             Your Updated Medication List - Protect others around you: Learn how to safely use, store and throw away your medicines at www.disposemymeds.org.          This list is accurate as of 10/31/18 11:59 PM.  Always use your most recent med list.                   Brand Name Dispense Instructions for use Diagnosis    emtricitabine-tenofovir 200-300 MG per tablet    TRUVADA    90 tablet    Take 1 tablet by mouth daily    Encounter for long-term (current) use of medications       lamoTRIgine 200 MG tablet    LaMICtal    90 tablet    Take 1 tablet (200 mg) by mouth daily    Bipolar 2 disorder (H), Moderate episode of recurrent major depressive disorder (H), TODD (generalized anxiety disorder)       LORazepam 1 MG tablet    ATIVAN    20 tablet    Take 1 tablet (1 mg) by mouth daily as needed for anxiety    TODD (generalized anxiety disorder)

## 2018-10-31 NOTE — Clinical Note
Patient hasn't been reached by phone.  Please send letter with LFT results and urgency of him contacting the clinic for instructions on meds to stop and follow-up.  Thanks,  JERSEY Izquierdo

## 2018-10-31 NOTE — PROGRESS NOTES
"  SUBJECTIVE:   Ambrosio Kemp is a 28 year old male who presents to clinic today for the following health issues:    Depression and Anxiety Follow-Up    Status since last visit: No change    Other associated symptoms: None    Complicating factors:     Significant life event: No     Current substance abuse: None    PHQ 9/27/2017 1/3/2018 8/1/2018   PHQ-9 Total Score 7 4 4   Q9: Suicide Ideation Not at all Not at all Not at all     TODD-7 SCORE 9/27/2017 1/3/2018 8/1/2018   Total Score 6 4 4     In the past two weeks have you had thoughts of suicide or self-harm?  No.    Do you have concerns about your personal safety or the safety of others?   No  PHQ-9  English  PHQ-9   Any Language  TODD-7  Suicide Assessment Five-step Evaluation and Treatment (SAFE-T)    Amount of exercise or physical activity: 4-5 days/week for an average of 30-45 minutes    Problems taking medications regularly: No    Medication side effects: none    Diet: regular (no restrictions)    Seeing [facility] for PrEP and STI testing  Tested positive for gonorrhea in the throat and treated - azithromycin and \"shot\"  Gets routine labs for PrEP  Kidney - creat 0.8, GFR neg    Problem list and histories reviewed & adjusted, as indicated.  Additional history: as documented    Reviewed and updated as needed this visit by clinical staff       Reviewed and updated as needed this visit by Provider         ROS:  Constitutional, HEENT, cardiovascular, pulmonary, gi and gu systems are negative, except as otherwise noted.    OBJECTIVE:     /88  Pulse 100  Temp 98  F (36.7  C) (Oral)  Wt 224 lb 12.8 oz (102 kg)  SpO2 96%  BMI 33.2 kg/m2  Body mass index is 33.2 kg/(m^2).  GENERAL: healthy, alert and no distress  EYES: Eyes grossly normal to inspection, PERRL and conjunctivae and sclerae normal  HENT: ear canals and TM's normal, nose and mouth without ulcers or lesions  NECK: no adenopathy, no asymmetry, masses, or scars and thyroid normal to palpation  RESP: " lungs clear to auscultation - no rales, rhonchi or wheezes  CV: regular rate and rhythm, normal S1 S2, no S3 or S4, no murmur, click or rub, no peripheral edema and peripheral pulses strong  ABDOMEN: soft, nontender, no hepatosplenomegaly, no masses and bowel sounds normal  MS: no gross musculoskeletal defects noted, no edema  SKIN: no suspicious lesions or rashes  PSYCH: mentation appears normal, affect normal/bright    Diagnostic Test Results:  Results for orders placed or performed in visit on 10/31/18   Hepatic panel   Result Value Ref Range    Bilirubin Direct 0.2 0.0 - 0.2 mg/dL    Bilirubin Total 0.4 0.2 - 1.3 mg/dL    Albumin 3.9 3.4 - 5.0 g/dL    Protein Total 7.9 6.8 - 8.8 g/dL    Alkaline Phosphatase 101 40 - 150 U/L     (H) 0 - 70 U/L     (H) 0 - 45 U/L       ASSESSMENT/PLAN:     Depression; recurrent episode-- Full remission   Associated with the following complications:    TODD and bipolar   Plan:  No changes in the patient's current treatment plan  Labs:  See orders      (F31.81) Bipolar 2 disorder (H)  (primary encounter diagnosis)  Comment:   Plan: lamoTRIgine (LAMICTAL) 200 MG tablet            (F33.1) Moderate episode of recurrent major depressive disorder (H)  Comment:   Plan: lamoTRIgine (LAMICTAL) 200 MG tablet            (F41.1) TODD (generalized anxiety disorder)  Comment:   Plan: lamoTRIgine (LAMICTAL) 200 MG tablet            (R94.5) Elevated LFTs  Comment:   Plan: Hepatic panel        Had been in decline and increased again this check.  Spoke with MTM who looked into his med list and feels the PrEP is the mostly likely cause.  Stop PrEP, all EtOH and recheck in one month.  See MTM to review medications, consider alternatives to lamictal if no improvement in LFTs      JAC Javed [facility]

## 2018-10-31 NOTE — LETTER
My Depression Action Plan  Name: Ambrosio Kemp   Date of Birth 1990  Date: 10/31/2018    My doctor: Rin Mena   My clinic: [address] [address] [phone]          GREEN    ZONE   Good Control    What it looks like:     Things are going generally well. You have normal up s and down s. You may even feel depressed from time to time, but bad moods usually last less than a day.   What you need to do:  1. Continue to care for yourself (see self care plan)  2. Check your depression survival kit and update it as needed  3. Follow your physician s recommendations including any medication.  4. Do not stop taking medication unless you consult with your physician first.           YELLOW         ZONE Getting Worse    What it looks like:     Depression is starting to interfere with your life.     It may be hard to get out of bed; you may be starting to isolate yourself from others.    Symptoms of depression are starting to last most all day and this has happened for several days.     You may have suicidal thoughts but they are not constant.   What you need to do:     1. Call your care team, your response to treatment will improve if you keep your care team informed of your progress. Yellow periods are signs an adjustment may need to be made.     2. Continue your self-care, even if you have to fake it!    3. Talk to someone in your support network    4. Open up your depression survival kit           RED    ZONE Medical Alert - Get Help    What it looks like:     Depression is seriously interfering with your life.     You may experience these or other symptoms: You can t get out of bed most days, can t work or engage in other necessary activities, you have trouble taking care of basic hygiene, or basic responsibilities, thoughts of suicide or death that will not go away, self-injurious behavior.     What you need to do:  1. Call your  care team and request a same-day appointment. If they are not available (weekends or after hours) call your local crisis line, emergency room or 911.            Depression Self Care Plan / Survival Kit    Self-Care for Depression  Here s the deal. Your body and mind are really not as separate as most people think.  What you do and think affects how you feel and how you feel influences what you do and think. This means if you do things that people who feel good do, it will help you feel better.  Sometimes this is all it takes.  There is also a place for medication and therapy depending on how severe your depression is, so be sure to consult with your medical provider and/ or Behavioral Health Consultant if your symptoms are worsening or not improving.     In order to better manage my stress, I will:    Exercise  Get some form of exercise, every day. This will help reduce pain and release endorphins, the  feel good  chemicals in your brain. This is almost as good as taking antidepressants!  This is not the same as joining a gym and then never going! (they count on that by the way ) It can be as simple as just going for a walk or doing some gardening, anything that will get you moving.      Hygiene   Maintain good hygiene (Get out of bed in the morning, Make your bed, Brush your teeth, Take a shower, and Get dressed like you were going to work, even if you are unemployed).  If your clothes don't fit try to get ones that do.    Diet  I will strive to eat foods that are good for me, drink plenty of water, and avoid excessive sugar, caffeine, alcohol, and other mood-altering substances.  Some foods that are helpful in depression are: complex carbohydrates, B vitamins, flaxseed, fish or fish oil, fresh fruits and vegetables.    Psychotherapy  I agree to participate in Individual Therapy (if recommended).    Medication  If prescribed medications, I agree to take them.  Missing doses can result in serious side effects.  I  understand that drinking alcohol, or other illicit drug use, may cause potential side effects.  I will not stop my medication abruptly without first discussing it with my provider.    Staying Connected With Others  I will stay in touch with my friends, family members, and my primary care provider/team.    Use your imagination  Be creative.  We all have a creative side; it doesn t matter if it s oil painting, sand castles, or mud pies! This will also kick up the endorphins.    Witness Beauty  (AKA stop and smell the roses) Take a look outside, even in mid-winter. Notice colors, textures. Watch the squirrels and birds.     Service to others  Be of service to others.  There is always someone else in need.  By helping others we can  get out of ourselves  and remember the really important things.  This also provides opportunities for practicing all the other parts of the program.    Humor  Laugh and be silly!  Adjust your TV habits for less news and crime-drama and more comedy.    Control your stress  Try breathing deep, massage therapy, biofeedback, and meditation. Find time to relax each day.     My support system    Clinic Contact:  Phone number:    Contact 1:  Phone number:    Contact 2:  Phone number:    Restoration/:  Phone number:    Therapist:  Phone number:    Local crisis center:    Phone number:    Other community support:  Phone number:

## 2018-11-01 LAB
ALBUMIN SERPL-MCNC: 3.9 G/DL (ref 3.4–5)
ALP SERPL-CCNC: 101 U/L (ref 40–150)
ALT SERPL W P-5'-P-CCNC: 232 U/L (ref 0–70)
AST SERPL W P-5'-P-CCNC: 338 U/L (ref 0–45)
BILIRUB DIRECT SERPL-MCNC: 0.2 MG/DL (ref 0–0.2)
BILIRUB SERPL-MCNC: 0.4 MG/DL (ref 0.2–1.3)
PROT SERPL-MCNC: 7.9 G/DL (ref 6.8–8.8)

## 2018-11-02 NOTE — PROGRESS NOTES
Ambrosio,    The liver function tests went up instead of resolving.  I'm concerned that these are related to the lamictal, but since it has been helpful to you I am going to run it by the pharmacist first before we take you off this so that I can confirm it can be the lamictal and then also if we need to stop it that we have a plan for replacement.  It should not be the PrEP causing this.  If you drink any alcohol at all I need you to stop completely for awhile.  Also no tylenol.  The liver recovers very well, so once we identify the cause, I am confident they will return to normal.  If you have any questions, please feel free to contact the clinic.    JERSEY Izquierdo

## 2018-11-07 ENCOUNTER — TELEPHONE (OUTPATIENT)
Dept: FAMILY MEDICINE | Facility: CLINIC | Age: 28
End: 2018-11-07

## 2018-11-07 DIAGNOSIS — Z79.899 ENCOUNTER FOR LONG-TERM (CURRENT) USE OF MEDICATIONS: ICD-10-CM

## 2018-11-07 DIAGNOSIS — F31.81 BIPOLAR 2 DISORDER (H): Primary | ICD-10-CM

## 2018-11-07 DIAGNOSIS — R79.89 ELEVATED LIVER FUNCTION TESTS: ICD-10-CM

## 2018-11-07 NOTE — TELEPHONE ENCOUNTER
Please contact Ambrosio.  The pharmacist reviewed his medications and the Truvada has more compelling evidence for liver problems than the lamictal, though both can cause the liver damage.  He should stop the Truvada, stop all alcohol, and no acetaminophen.  I would like him to see psychiatry MTM for help with the lamictal.  This has worked fairly well for him and I want him to be able to discuss the risks and benefits of other med options.  Order for MTM is in.  Please find out who patient sees at [facility] so that I can speak with them.  CRSI Mena, JERSEY

## 2018-11-07 NOTE — LETTER
November 15, 2018      Ambrosio Kemp  [address]  [facility] [address]        Dear ,    The results of the liver function test you completed on 10/31/2018 are listed below. Your liver enzymes are elevated.     It is urgent that you contact the clinic as soon as possible for instructions on medication changes and follow up instruction.     Resulted Orders   Hepatic panel   Result Value Ref Range    Bilirubin Direct 0.2 0.0 - 0.2 mg/dL    Bilirubin Total 0.4 0.2 - 1.3 mg/dL    Albumin 3.9 3.4 - 5.0 g/dL    Protein Total 7.9 6.8 - 8.8 g/dL    Alkaline Phosphatase 101 40 - 150 U/L     (H) 0 - 70 U/L     (H) 0 - 45 U/L     Again, please contact the clinic as soon as possible to discuss medication changes and a plan for follow up.        Sincerely,  JAC Javed CNP

## 2018-11-07 NOTE — TELEPHONE ENCOUNTER
Called patient. LVM to call clinic.    MTM referral: Scheduling [phone], please see Crystal Alvarez RN  [facility]

## 2018-11-09 ENCOUNTER — TELEPHONE (OUTPATIENT)
Dept: PHARMACY | Facility: OTHER | Age: 28
End: 2018-11-09

## 2018-11-09 NOTE — TELEPHONE ENCOUNTER
MTM referral from: [facility] visit (referral by provider)    MTM referral outreach attempt #2 on November 9, 2018 at 12:34 PM      Outcome: Patient not reachable after several attempts, will route to MTM Pharmacist/Provider as an FYI. Thank you for the referral.    Kat Barraza, MTM Coordinator

## 2018-11-15 NOTE — TELEPHONE ENCOUNTER
Lab result letter relaying provider message below mailed to patient.    Miroslava Reinoso, RN  Triage Nurse

## 2018-11-15 NOTE — TELEPHONE ENCOUNTER
Per CC'd chart:    Rin Mena, APRN CNP  [address] Triage Pod A                     Patient hasn't been reached by phone.  Please send letter with LFT results and urgency of him contacting the clinic for instructions on meds to stop and follow-up.  Thanks,  K. Venessa Mena, FNP       Miroslava Reinoso, RN  Triage Nurse

## 2019-01-19 ENCOUNTER — MYC REFILL (OUTPATIENT)
Dept: FAMILY MEDICINE | Facility: CLINIC | Age: 29
End: 2019-01-19

## 2019-01-19 DIAGNOSIS — F33.1 MODERATE EPISODE OF RECURRENT MAJOR DEPRESSIVE DISORDER (H): ICD-10-CM

## 2019-01-19 DIAGNOSIS — F31.81 BIPOLAR 2 DISORDER (H): ICD-10-CM

## 2019-01-19 DIAGNOSIS — F41.1 GAD (GENERALIZED ANXIETY DISORDER): ICD-10-CM

## 2019-01-19 RX ORDER — LORAZEPAM 1 MG/1
1 TABLET ORAL DAILY PRN
Qty: 20 TABLET | Refills: 0 | Status: CANCELLED | OUTPATIENT
Start: 2019-01-19

## 2019-01-22 NOTE — TELEPHONE ENCOUNTER
Routing refill request to provider for review/approval because:  Pt has not followed through with plan  Lab letter had been sent to pt with provider recommendation  MTM has not been able to contact pt     not checked, not authorized by this provider  Last Lorazapam script written 8/1/18 for 20    Tiffany Frost RN   [facility]

## 2019-01-23 RX ORDER — LAMOTRIGINE 200 MG/1
200 TABLET ORAL DAILY
Qty: 90 TABLET | Refills: 0 | Status: SHIPPED | OUTPATIENT
Start: 2019-01-23 | End: 2019-11-16

## 2019-01-24 NOTE — TELEPHONE ENCOUNTER
Asked patient to contact clinic on his prescription for lamictal.  Ativan not refilled.  JERSEY Izquierdo

## 2019-01-28 ENCOUNTER — MYC REFILL (OUTPATIENT)
Dept: FAMILY MEDICINE | Facility: CLINIC | Age: 29
End: 2019-01-28

## 2019-01-28 DIAGNOSIS — F41.1 GAD (GENERALIZED ANXIETY DISORDER): ICD-10-CM

## 2019-01-28 DIAGNOSIS — F33.1 MODERATE EPISODE OF RECURRENT MAJOR DEPRESSIVE DISORDER (H): ICD-10-CM

## 2019-01-28 DIAGNOSIS — F31.81 BIPOLAR 2 DISORDER (H): ICD-10-CM

## 2019-01-28 NOTE — TELEPHONE ENCOUNTER
Venessa,    Please review/sign or advise for refill of LORazepam (ATIVAN) 1 MG tablet. (Second request from patient).    : lorazepam last filled on 08/01/18.    BackupAgent message sent to patient to contact the pharmacy for refill of lamictal.     Fawn Alvarez RN  [facility]

## 2019-01-28 NOTE — TELEPHONE ENCOUNTER
"Requested Prescriptions   Pending Prescriptions Disp Refills     LORazepam (ATIVAN) 1 MG tablet 20 tablet 0     Sig: Take 1 tablet (1 mg) by mouth daily as needed for anxiety    There is no refill protocol information for this order        lamoTRIgine (LAMICTAL) 200 MG tablet 90 tablet 0     Sig: Take 1 tablet (200 mg) by mouth daily Please call clinic at [phone] for important information    Anti-Seizure Meds Protocol  Failed - 1/28/2019  2:32 PM       Failed - Review Authorizing provider's last note.     Refer to last progress notes: confirm request is for original authorizing provider (cannot be through other providers).         Failed - Normal ALT or AST on file in past 26 months    Recent Labs   Lab Test 10/31/18  1354   *     Recent Labs   Lab Test 10/31/18  1354   *            Passed - Recent (12 mo) or future (30 days) visit within the authorizing provider's specialty    Patient had office visit in the last 12 months or has a visit in the next 30 days with authorizing provider or within the authorizing provider's specialty.  See \"Patient Info\" tab in inbasket, or \"Choose Columns\" in Meds & Orders section of the refill encounter.             Passed - Normal CBC on file in past 26 months    Recent Labs   Lab Test 05/02/18  1057   WBC 5.8   RBC 5.15   HGB 16.9   HCT 49.5                   Passed - Normal platelet count on file in past 26 months    Recent Labs   Lab Test 05/02/18  1057                 Passed - Medication is active on med list        "

## 2019-01-30 RX ORDER — LAMOTRIGINE 200 MG/1
200 TABLET ORAL DAILY
Qty: 90 TABLET | Refills: 0 | OUTPATIENT
Start: 2019-01-30

## 2019-01-30 RX ORDER — LORAZEPAM 1 MG/1
1 TABLET ORAL DAILY PRN
Qty: 20 TABLET | Refills: 0 | OUTPATIENT
Start: 2019-01-30

## 2019-01-30 NOTE — TELEPHONE ENCOUNTER
Refill was denied by provider, request refused    Tiffany Frost RN   [facility] [place][note 2]

## 2019-11-14 ENCOUNTER — MYC REFILL (OUTPATIENT)
Dept: FAMILY MEDICINE | Facility: CLINIC | Age: 29
End: 2019-11-14

## 2019-11-14 DIAGNOSIS — F33.1 MODERATE EPISODE OF RECURRENT MAJOR DEPRESSIVE DISORDER (H): ICD-10-CM

## 2019-11-14 DIAGNOSIS — F41.1 GAD (GENERALIZED ANXIETY DISORDER): ICD-10-CM

## 2019-11-14 DIAGNOSIS — F31.81 BIPOLAR 2 DISORDER (H): ICD-10-CM

## 2019-11-14 RX ORDER — LORAZEPAM 1 MG/1
1 TABLET ORAL DAILY PRN
Qty: 20 TABLET | Refills: 0 | OUTPATIENT
Start: 2019-11-14

## 2019-11-14 RX ORDER — LAMOTRIGINE 200 MG/1
200 TABLET ORAL DAILY
Qty: 90 TABLET | Refills: 0 | OUTPATIENT
Start: 2019-11-14

## 2019-11-14 NOTE — TELEPHONE ENCOUNTER
Called and left patient VMM with providers message. Medication refused. Patients needs to be seen prior to refilling. Charmaine Hdez RN on 11/14/2019 at 1:41 PM

## 2019-11-14 NOTE — TELEPHONE ENCOUNTER
I have not seen the patient in over a year.  I also haven't refilled since January so it's unclear to me if Ambrosio is currently on this medication.  I could potentially bridge an active administration of lamictal until a completed visit.  No ativan refill.  JERSEY Izquierdo

## 2019-11-14 NOTE — TELEPHONE ENCOUNTER
"Requested Prescriptions   Pending Prescriptions Disp Refills     LORazepam (ATIVAN) 1 MG tablet 20 tablet 0     Sig: Take 1 tablet (1 mg) by mouth daily as needed for anxiety   Last Written Prescription Date:  8/1/18  Last Fill Quantity: 20,  # refills: 0   Last office visit: 10/31/2018 with prescribing provider:  Venessa Mena CNP   Future Office Visit:       There is no refill protocol information for this order        lamoTRIgine (LAMICTAL) 200 MG tablet 90 tablet 0     Sig: Take 1 tablet (200 mg) by mouth daily Please call clinic at [phone] for important information   Last Written Prescription Date:  1/23/19  Last Fill Quantity: 90,  # refills: 0   Last office visit: 10/31/2018 with prescribing provider:  Venessa Mena CNP   Future Office Visit:        Anti-Seizure Meds Protocol  Failed - 11/14/2019 12:23 PM        Failed - Recent (12 mo) or future (30 days) visit within the authorizing provider's specialty     Patient has had an office visit with the authorizing provider or a provider within the authorizing providers department within the previous 12 mos or has a future within next 30 days. See \"Patient Info\" tab in inbasket, or \"Choose Columns\" in Meds & Orders section of the refill encounter.              Failed - Review Authorizing provider's last note.      Refer to last progress notes: confirm request is for original authorizing provider (cannot be through other providers).          Failed - Normal ALT or AST on file in past 26 months     Recent Labs   Lab Test 10/31/18  1354   *     Recent Labs   Lab Test 10/31/18  1354   *             Passed - Normal CBC on file in past 26 months     Recent Labs   Lab Test 05/02/18  1057   WBC 5.8   RBC 5.15   HGB 16.9   HCT 49.5                    Passed - Normal platelet count on file in past 26 months     Recent Labs   Lab Test 05/02/18  1057                  Passed - Medication is active on med list      Routing refill request to " provider for review/approval because:  Patient needs to be seen because it has been more than 1 year since last office visit, and labs are due.

## 2019-11-16 ENCOUNTER — NURSE TRIAGE (OUTPATIENT)
Dept: NURSING | Facility: CLINIC | Age: 29
End: 2019-11-16

## 2019-11-16 DIAGNOSIS — F31.81 BIPOLAR 2 DISORDER (H): ICD-10-CM

## 2019-11-16 DIAGNOSIS — F33.1 MODERATE EPISODE OF RECURRENT MAJOR DEPRESSIVE DISORDER (H): ICD-10-CM

## 2019-11-16 DIAGNOSIS — F41.1 GAD (GENERALIZED ANXIETY DISORDER): ICD-10-CM

## 2019-11-16 NOTE — TELEPHONE ENCOUNTER
Ambrosio calling with a refill request for Lamictal. He attempted to refill this prescription on 11/14/19 with the clinic, but it was not addressed. Failed refill protocol. Paged on-call provider, Dr. Bianchi at 5pm. Spoke with MD at 5:05pm. Order received to fill for 3 day supply with PCP to address further refills on Monday 11/18/19. Patient informed, 3 day refill called verbally to [facility] on [address]. Refill encounter routed to PCP.   Venessa Vaughn, RN, BSN  [facility]

## 2019-11-16 NOTE — TELEPHONE ENCOUNTER
"Ambrosio calling with a refill request for Lamictal. He attempted to refill this prescription on 11/14/19 with the clinic, but it was not addressed. Failed refill protocol. Paged on-call provider, Dr. Bianchi at 5pm. Spoke with MD at 5:05pm. Order received to fill for 3 day supply with PCP to address further refills on Monday 11/18/19. Patient informed, 3 day refill called verbally to [facility] on [address]. Refill encounter routed to PCP.   Venessa Vaughn, RN, BSN  [facility]    Reason for Disposition    Caller requesting a NON-URGENT new prescription or refill and triager unable to refill per unit policy    Additional Information    Negative: Drug overdose and nurse unable to answer question    Negative: Caller requesting information not related to medicine    Negative: Caller requesting a prescription for Strep throat and has a positive culture result    Negative: Rash while taking a medication or within 3 days of stopping it    Negative: Immunization reaction suspected    Negative: [1] Asthma and [2] having symptoms of asthma (cough, wheezing, etc)    Negative: MORE THAN A DOUBLE DOSE of a prescription or over-the-counter (OTC) drug    Negative: [1] DOUBLE DOSE (an extra dose or lesser amount) of over-the-counter (OTC) drug AND [2] any symptoms (e.g., dizziness, nausea, pain, sleepiness)    Negative: [1] DOUBLE DOSE (an extra dose or lesser amount) of prescription drug AND [2] any symptoms (e.g., dizziness, nausea, pain, sleepiness)    Negative: Took another person's prescription drug    Negative: [1] DOUBLE DOSE (an extra dose or lesser amount) of prescription drug AND [2] NO symptoms (Exception: a double dose of antibiotics)    Negative: Diabetes drug error or overdose (e.g., insulin or extra dose)    Negative: [1] Request for URGENT new prescription or refill of \"essential\" medication (i.e., likelihood of harm to patient if not taken) AND [2] triager unable to fill per unit policy    Negative: [1] " Prescription not at pharmacy AND [2] was prescribed today by PCP    Negative: Pharmacy calling with prescription questions and triager unable to answer question    Negative: Caller has URGENT medication question about med that PCP prescribed and triager unable to answer question    Negative: Caller has NON-URGENT medication question about med that PCP prescribed and triager unable to answer question    Protocols used: MEDICATION QUESTION CALL-A-AH

## 2019-11-18 RX ORDER — LAMOTRIGINE 200 MG/1
200 TABLET ORAL DAILY
Qty: 30 TABLET | Refills: 0 | Status: SHIPPED | OUTPATIENT
Start: 2019-11-18 | End: 2019-11-20

## 2019-11-18 NOTE — TELEPHONE ENCOUNTER
"Requested Prescriptions   Pending Prescriptions Disp Refills     lamoTRIgine (LAMICTAL) 200 MG tablet 30 tablet 0     Sig: Take 1 tablet (200 mg) by mouth daily Please call clinic at [phone] for important information   Last Written Prescription Date:  1/23/19  Last Fill Quantity: 90,  # refills: 0     Next 5 appointments (look out 90 days)    Nov 20, 2019 11:15 AM CST  Office Visit with JAC Mckinney CNP  [facility] ([facility]) [address] [place] [id number] [phone]             Anti-Seizure Meds Protocol  Failed - 11/18/2019 12:05 PM        Failed - Recent (12 mo) or future (30 days) visit within the authorizing provider's specialty     Patient has had an office visit with the authorizing provider or a provider within the authorizing providers department within the previous 12 mos or has a future within next 30 days. See \"Patient Info\" tab in inbasket, or \"Choose Columns\" in Meds & Orders section of the refill encounter.              Failed - Review Authorizing provider's last note.      Refer to last progress notes: confirm request is for original authorizing provider (cannot be through other providers).          Failed - Normal ALT or AST on file in past 26 months     Recent Labs   Lab Test 10/31/18  1354   *     Recent Labs   Lab Test 10/31/18  1354   *             Passed - Normal CBC on file in past 26 months     Recent Labs   Lab Test 05/02/18  1057   WBC 5.8   RBC 5.15   HGB 16.9   HCT 49.5                    Passed - Normal platelet count on file in past 26 months     Recent Labs   Lab Test 05/02/18  1057                  Passed - Medication is active on med list          "

## 2019-11-18 NOTE — TELEPHONE ENCOUNTER
Medication sent per provider note ok to fill d/t patient scheduling.    Janina Haile RN   [facility] [place]

## 2019-11-20 ENCOUNTER — OFFICE VISIT (OUTPATIENT)
Dept: FAMILY MEDICINE | Facility: CLINIC | Age: 29
End: 2019-11-20
Payer: COMMERCIAL

## 2019-11-20 VITALS
OXYGEN SATURATION: 95 % | WEIGHT: 257.3 LBS | DIASTOLIC BLOOD PRESSURE: 84 MMHG | HEART RATE: 91 BPM | HEIGHT: 68 IN | SYSTOLIC BLOOD PRESSURE: 136 MMHG | BODY MASS INDEX: 39 KG/M2

## 2019-11-20 DIAGNOSIS — F41.1 GAD (GENERALIZED ANXIETY DISORDER): ICD-10-CM

## 2019-11-20 DIAGNOSIS — R74.8 ELEVATED LIVER ENZYMES: ICD-10-CM

## 2019-11-20 DIAGNOSIS — R63.5 WEIGHT GAIN: ICD-10-CM

## 2019-11-20 DIAGNOSIS — F33.1 MODERATE EPISODE OF RECURRENT MAJOR DEPRESSIVE DISORDER (H): ICD-10-CM

## 2019-11-20 DIAGNOSIS — F31.81 BIPOLAR 2 DISORDER (H): Primary | ICD-10-CM

## 2019-11-20 LAB
ALBUMIN SERPL-MCNC: 4.4 G/DL (ref 3.4–5)
ALP SERPL-CCNC: 95 U/L (ref 40–150)
ALT SERPL W P-5'-P-CCNC: 115 U/L (ref 0–70)
ANION GAP SERPL CALCULATED.3IONS-SCNC: 7 MMOL/L (ref 3–14)
AST SERPL W P-5'-P-CCNC: 227 U/L (ref 0–45)
BILIRUB SERPL-MCNC: 0.8 MG/DL (ref 0.2–1.3)
BUN SERPL-MCNC: 11 MG/DL (ref 7–30)
CALCIUM SERPL-MCNC: 9.6 MG/DL (ref 8.5–10.1)
CHLORIDE SERPL-SCNC: 104 MMOL/L (ref 94–109)
CO2 SERPL-SCNC: 26 MMOL/L (ref 20–32)
CREAT SERPL-MCNC: 0.67 MG/DL (ref 0.66–1.25)
GFR SERPL CREATININE-BSD FRML MDRD: >90 ML/MIN/{1.73_M2}
GGT SERPL-CCNC: 444 U/L (ref 0–75)
GLUCOSE SERPL-MCNC: 94 MG/DL (ref 70–99)
HBA1C MFR BLD: 5.1 % (ref 0–5.6)
POTASSIUM SERPL-SCNC: 3.8 MMOL/L (ref 3.4–5.3)
PROT SERPL-MCNC: 8.1 G/DL (ref 6.8–8.8)
SODIUM SERPL-SCNC: 137 MMOL/L (ref 133–144)

## 2019-11-20 PROCEDURE — 80053 COMPREHEN METABOLIC PANEL: CPT | Performed by: NURSE PRACTITIONER

## 2019-11-20 PROCEDURE — 83036 HEMOGLOBIN GLYCOSYLATED A1C: CPT | Performed by: NURSE PRACTITIONER

## 2019-11-20 PROCEDURE — 99214 OFFICE O/P EST MOD 30 MIN: CPT | Performed by: NURSE PRACTITIONER

## 2019-11-20 PROCEDURE — 36415 COLL VENOUS BLD VENIPUNCTURE: CPT | Performed by: NURSE PRACTITIONER

## 2019-11-20 PROCEDURE — 82977 ASSAY OF GGT: CPT | Performed by: NURSE PRACTITIONER

## 2019-11-20 RX ORDER — LORAZEPAM 1 MG/1
1 TABLET ORAL DAILY PRN
Qty: 20 TABLET | Refills: 0 | Status: SHIPPED | OUTPATIENT
Start: 2019-11-20 | End: 2022-05-17

## 2019-11-20 RX ORDER — LAMOTRIGINE 200 MG/1
200 TABLET ORAL DAILY
Qty: 90 TABLET | Refills: 1 | Status: SHIPPED | OUTPATIENT
Start: 2019-11-20 | End: 2020-07-02

## 2019-11-20 ASSESSMENT — MIFFLIN-ST. JEOR: SCORE: 2120.58

## 2019-11-20 NOTE — PATIENT INSTRUCTIONS
Continue lamictal  Ok ativan use at current minimal level (less than twice per month average)      - eat a balanced diet with lots of fruits, vegetables, protein, and whole grains   (organic best).  Try decreasing or avoiding sugars, trans fats, and white flour  -consider these supplements daily:    multiple vitamins or B complex with at least 50 mg of B6,    fish oil 1000 mg twice daily or 2 TBS ground flax seed meal    Vit D3 0668-9549 IU     Probiotics (healthy bacteria) especially if stomach or bowel symptoms   - exercise regularly with at least 30 minutes of movement daily or 5 hours per week  - sleep at least 8 hours per night    If having difficulty getting to sleep, try Magnesium glycinate 400-600 mg or    melatonin 1-3 mg in the evening    If having difficulty staying asleep, try L-theanine 100-200 mg at bedtime or   Passionflower tincture, tea or capsule  -practice meditation or relaxation daily   Yoga, Checo Chi, Qi Gong, sitting or walking meditation or   whatever you do that is meditative--that which empties the mind of worry   Sewing, reading, cooking, gardening, fishing, praying, etc  -consider starting or continuing in counseling or therapy   Contact your health insurance for resources  -seek support from friends and family and raul communities   -other alternative therapies may also be of help such as acupuncture, homeopathy,  traditional chinese medicine, massage, etc    Resources/Books:  Unstuck by Hany Gutierrez     The Chemistry of Nubia  by Gaston Velazquez     The Chemistry of Calm  by Gaston Velazquez     Total Catastrophe Living by Andrew Mahoney    Mindfulness-Based Stress Reduction Classes:    [facility]  [email or web address]  [phone]    [facility]  [email or web address]    [facility]  [email or web address]    [facility] [place]  [email or web address]

## 2019-11-20 NOTE — PROGRESS NOTES
"Subjective     Ambrosio Kemp is a 29 year old male who presents to clinic today for the following health issues:    HPI   Bipolar, Depression and Anxiety Follow-Up    How are you doing with your depression since your last visit? Improved - about the same    How are you doing with your anxiety since your last visit?  Improved - about the same     On Lamictal about 1.5 years    Manic episodes: Yes:  \"nothing too bad\" if trigger like not sleeping enough - occurs as staying up all night    Therapist:  Not any more    Psychiatry:  Not any more    Are you having other symptoms that might be associated with depression or anxiety? No    Have you had a significant life event? No     Do you have any concerns with your use of alcohol or other drugs? No     6-8 drinks on the weekend total - less than a year ago; previous LFTs were very elevated - patient drinking at least double current amount, had no trouble cutting back    No longer taking PrEP - insurance change and in monogamous relationship, had been constantly nauseous    Wt Readings from Last 5 Encounters:   11/20/19 116.7 kg (257 lb 4.8 oz)   10/31/18 102 kg (224 lb 12.8 oz)   08/01/18 102.1 kg (225 lb)   05/02/18 100.6 kg (221 lb 12.8 oz)   01/03/18 99.9 kg (220 lb 3.2 oz)       Social History     Tobacco Use     Smoking status: Never Smoker     Smokeless tobacco: Never Used   Substance Use Topics     Alcohol use: Yes     Drug use: No     PHQ 9/27/2017 1/3/2018 8/1/2018   PHQ-9 Total Score 7 4 4   Q9: Thoughts of better off dead/self-harm past 2 weeks Not at all Not at all Not at all     TODD-7 SCORE 9/27/2017 1/3/2018 8/1/2018   Total Score 6 4 4       Suicide Assessment Five-step Evaluation and Treatment (SAFE-T)      How many servings of fruits and vegetables do you eat daily?  2-3    On average, how many sweetened beverages do you drink each day (soda, juice, sweet tea, etc)?   0    How many days per week do you miss taking your medication? 0     Reviewed and " "updated as needed this visit by Provider         Review of Systems   ROS COMP: Constitutional, HEENT, cardiovascular, pulmonary, gi and gu systems are negative, except as otherwise noted.      Objective    /84   Pulse 91   Ht 1.734 m (5' 8.25\")   Wt 116.7 kg (257 lb 4.8 oz)   SpO2 95%   BMI 38.84 kg/m    Body mass index is 38.84 kg/m .  Physical Exam   GENERAL: healthy, alert and no distress  EYES: Eyes grossly normal to inspection, PERRL and conjunctivae and sclerae normal  HENT: ear canals and TM's normal, nose and mouth without ulcers or lesions  NECK: no adenopathy, no asymmetry, masses, or scars and thyroid normal to palpation  RESP: lungs clear to auscultation - no rales, rhonchi or wheezes  CV: regular rate and rhythm, normal S1 S2, no S3 or S4, no murmur, click or rub, no peripheral edema and peripheral pulses strong  ABDOMEN: soft, nontender, no hepatosplenomegaly, no masses and bowel sounds normal  MS: no gross musculoskeletal defects noted, no edema  SKIN: no suspicious lesions or rashes  NEURO: Normal strength and tone, sensory exam grossly normal and mentation intact  PSYCH: mentation appears normal, affect normal/bright    Diagnostic Test Results:  Labs reviewed in Epic  Results for orders placed or performed in visit on 11/20/19   Comprehensive metabolic panel     Status: Abnormal   Result Value Ref Range    Sodium 137 133 - 144 mmol/L    Potassium 3.8 3.4 - 5.3 mmol/L    Chloride 104 94 - 109 mmol/L    Carbon Dioxide 26 20 - 32 mmol/L    Anion Gap 7 3 - 14 mmol/L    Glucose 94 70 - 99 mg/dL    Urea Nitrogen 11 7 - 30 mg/dL    Creatinine 0.67 0.66 - 1.25 mg/dL    GFR Estimate >90 >60 mL/min/[1.73_m2]    GFR Estimate If Black >90 >60 mL/min/[1.73_m2]    Calcium 9.6 8.5 - 10.1 mg/dL    Bilirubin Total 0.8 0.2 - 1.3 mg/dL    Albumin 4.4 3.4 - 5.0 g/dL    Protein Total 8.1 6.8 - 8.8 g/dL    Alkaline Phosphatase 95 40 - 150 U/L     (H) 0 - 70 U/L     (H) 0 - 45 U/L   GGT     " Status: Abnormal   Result Value Ref Range     (H) 0 - 75 U/L   Hemoglobin A1c     Status: None   Result Value Ref Range    Hemoglobin A1C 5.1 0 - 5.6 %           Assessment & Plan     (F31.81) Bipolar 2 disorder (H)  (primary encounter diagnosis)  Comment:   Plan: lamoTRIgine (LAMICTAL) 200 MG tablet, order for        DME  Add light therapy, more exercise, therapy           (F33.1) Moderate episode of recurrent major depressive disorder (H)  Comment:   Plan: lamoTRIgine (LAMICTAL) 200 MG tablet, order for        DME          (F41.1) TODD (generalized anxiety disorder)  Comment:   Plan: lamoTRIgine (LAMICTAL) 200 MG tablet, LORazepam        (ATIVAN) 1 MG tablet          (R74.8) Elevated liver enzymes  Comment:   Plan: Comprehensive metabolic panel, GGT       (R63.5) Weight gain  Comment:   Plan: Hemoglobin A1c            Patient Instructions   Continue lamictal  Ok ativan use at current minimal level (less than twice per month average)      - eat a balanced diet with lots of fruits, vegetables, protein, and whole grains   (organic best).  Try decreasing or avoiding sugars, trans fats, and white flour  -consider these supplements daily:    multiple vitamins or B complex with at least 50 mg of B6,    fish oil 1000 mg twice daily or 2 TBS ground flax seed meal    Vit D3 5061-5713 IU     Probiotics (healthy bacteria) especially if stomach or bowel symptoms   - exercise regularly with at least 30 minutes of movement daily or 5 hours per week  - sleep at least 8 hours per night    If having difficulty getting to sleep, try Magnesium glycinate 400-600 mg or    melatonin 1-3 mg in the evening    If having difficulty staying asleep, try L-theanine 100-200 mg at bedtime or   Passionflower tincture, tea or capsule  -practice meditation or relaxation daily   Yoga, Checo Chi, Qi Gong, sitting or walking meditation or   whatever you do that is meditative--that which empties the mind of worry   Sewing, reading, cooking,  gardening, fishing, praying, etc  -consider starting or continuing in counseling or therapy   Contact your health insurance for resources  -seek support from friends and family and raul communities   -other alternative therapies may also be of help such as acupuncture, homeopathy,  traditional chinese medicine, massage, etc    Resources/Books:  Unstuck by Hany Gutierrez     The Chemistry of Nubia  by Gaston Velazquez     The Chemistry of Calm  by Gaston Velazquez     Total Catastrophe Living by Andrew Mahoney    Mindfulness-Based Stress Reduction Classes:    [facility]  [email or web address]  [phone]    [facility]  [email or web address]    [facility]  [email or web address]    [facility] [place]  [email or web address]              Return in about 6 months (around 5/20/2020) for mental health check.    JAC Javed [facility]

## 2019-11-27 NOTE — RESULT ENCOUNTER NOTE
Ambrosio,    Your liver function tests are again elevated.  The GGT level is also elevated which points toward alcohol as a likely cause.  I would like you to completely abstain from alcohol over the next 6 weeks and then return to recheck.      The good news is that your A1C is solidly *not* diabetic and kidneys look great.    If you have any questions, please feel free to contact the clinic.    JERSEY Izquierdo

## 2020-03-02 ENCOUNTER — HEALTH MAINTENANCE LETTER (OUTPATIENT)
Age: 30
End: 2020-03-02

## 2020-06-30 ENCOUNTER — MYC REFILL (OUTPATIENT)
Dept: FAMILY MEDICINE | Facility: CLINIC | Age: 30
End: 2020-06-30

## 2020-06-30 DIAGNOSIS — F31.81 BIPOLAR 2 DISORDER (H): ICD-10-CM

## 2020-06-30 DIAGNOSIS — F33.1 MODERATE EPISODE OF RECURRENT MAJOR DEPRESSIVE DISORDER (H): ICD-10-CM

## 2020-06-30 DIAGNOSIS — F41.1 GAD (GENERALIZED ANXIETY DISORDER): ICD-10-CM

## 2020-06-30 RX ORDER — LAMOTRIGINE 200 MG/1
200 TABLET ORAL DAILY
Qty: 90 TABLET | Refills: 1 | Status: CANCELLED | OUTPATIENT
Start: 2020-06-30

## 2020-07-02 ENCOUNTER — VIRTUAL VISIT (OUTPATIENT)
Dept: FAMILY MEDICINE | Facility: CLINIC | Age: 30
End: 2020-07-02
Payer: COMMERCIAL

## 2020-07-02 DIAGNOSIS — F31.81 BIPOLAR 2 DISORDER (H): Primary | ICD-10-CM

## 2020-07-02 DIAGNOSIS — F10.90 HEAVY ALCOHOL USE: ICD-10-CM

## 2020-07-02 DIAGNOSIS — K76.9 LIVER DAMAGE: ICD-10-CM

## 2020-07-02 DIAGNOSIS — F33.1 MODERATE EPISODE OF RECURRENT MAJOR DEPRESSIVE DISORDER (H): ICD-10-CM

## 2020-07-02 DIAGNOSIS — F41.1 GAD (GENERALIZED ANXIETY DISORDER): ICD-10-CM

## 2020-07-02 PROCEDURE — 99214 OFFICE O/P EST MOD 30 MIN: CPT | Mod: 95 | Performed by: NURSE PRACTITIONER

## 2020-07-02 RX ORDER — LAMOTRIGINE 200 MG/1
200 TABLET ORAL DAILY
Qty: 90 TABLET | Refills: 1 | Status: SHIPPED | OUTPATIENT
Start: 2020-07-02 | End: 2021-01-15
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
## 2020-08-03 DIAGNOSIS — F10.90 HEAVY ALCOHOL USE: ICD-10-CM

## 2020-08-03 PROCEDURE — 82977 ASSAY OF GGT: CPT | Performed by: NURSE PRACTITIONER

## 2020-08-03 PROCEDURE — 36415 COLL VENOUS BLD VENIPUNCTURE: CPT | Performed by: NURSE PRACTITIONER

## 2020-08-03 PROCEDURE — 80076 HEPATIC FUNCTION PANEL: CPT | Performed by: NURSE PRACTITIONER

## 2020-08-04 LAB
ALBUMIN SERPL-MCNC: 4.1 G/DL (ref 3.4–5)
ALP SERPL-CCNC: 92 U/L (ref 40–150)
ALT SERPL W P-5'-P-CCNC: 38 U/L (ref 0–70)
AST SERPL W P-5'-P-CCNC: 44 U/L (ref 0–45)
BILIRUB DIRECT SERPL-MCNC: 0.2 MG/DL (ref 0–0.2)
BILIRUB SERPL-MCNC: 0.8 MG/DL (ref 0.2–1.3)
GGT SERPL-CCNC: 206 U/L (ref 0–75)
PROT SERPL-MCNC: 8 G/DL (ref 6.8–8.8)

## 2020-09-16 NOTE — RESULT ENCOUNTER NOTE
Ambrosio,    Your labs look like they are normalizing.  How has it been going with reducing or eliminating drinking alcohol? If you have any questions, please feel free to contact the clinic.    JERSEY Izquierdo

## 2020-12-20 ENCOUNTER — HEALTH MAINTENANCE LETTER (OUTPATIENT)
Age: 30
End: 2020-12-20

## 2021-01-14 ENCOUNTER — TELEPHONE (OUTPATIENT)
Dept: FAMILY MEDICINE | Facility: CLINIC | Age: 31
End: 2021-01-14

## 2021-01-14 DIAGNOSIS — F31.81 BIPOLAR 2 DISORDER (H): ICD-10-CM

## 2021-01-14 DIAGNOSIS — F41.1 GAD (GENERALIZED ANXIETY DISORDER): ICD-10-CM

## 2021-01-14 DIAGNOSIS — F33.1 MODERATE EPISODE OF RECURRENT MAJOR DEPRESSIVE DISORDER (H): ICD-10-CM

## 2021-01-15 RX ORDER — LAMOTRIGINE 200 MG/1
200 TABLET ORAL DAILY
Qty: 30 TABLET | Refills: 0 | Status: SHIPPED | OUTPATIENT
Start: 2021-01-15 | End: 2021-02-17

## 2021-01-15 NOTE — TELEPHONE ENCOUNTER
"Requested Prescriptions   Pending Prescriptions Disp Refills     lamoTRIgine (LAMICTAL) 200 MG tablet 90 tablet 1     Sig: Take 1 tablet (200 mg) by mouth daily       Anti-Seizure Meds Protocol  Failed - 1/14/2021  9:30 AM        Failed - Review Authorizing provider's last note.      Refer to last progress notes: confirm request is for original authorizing provider (cannot be through other providers).          Failed - Normal CBC on file in past 26 months     Recent Labs   Lab Test 05/02/18  1057   WBC 5.8   RBC 5.15   HGB 16.9   HCT 49.5                    Failed - Normal platelet count on file in past 26 months     Recent Labs   Lab Test 05/02/18  1057                  Passed - Recent (12 mo) or future (30 days) visit within the authorizing provider's specialty     Patient has had an office visit with the authorizing provider or a provider within the authorizing providers department within the previous 12 mos or has a future within next 30 days. See \"Patient Info\" tab in inbasket, or \"Choose Columns\" in Meds & Orders section of the refill encounter.              Passed - Normal ALT or AST on file in past 26 months     Recent Labs   Lab Test 08/03/20  1514   ALT 38     Recent Labs   Lab Test 08/03/20  1514   AST 44             Passed - Medication is active on med list         Routing refill request to provider for review/approval because:  Labs not current:  CBC, plt  Labs cued    Janina Haile RN   [facility]   "

## 2021-01-26 NOTE — TELEPHONE ENCOUNTER
Attempt #2, patient scheduled lab appointment. Left voice message to call clinic to schedule virtual appointment with provider to go over results.

## 2021-02-15 ENCOUNTER — APPOINTMENT (OUTPATIENT)
Dept: LAB | Facility: CLINIC | Age: 31
End: 2021-02-15
Payer: COMMERCIAL

## 2021-02-15 NOTE — TELEPHONE ENCOUNTER
Left msg for Pt to schedule a visit with provider. See M.S. notes in previous enc. Provider will order labs at time of visit- can be virtual visit.       Neno Álvarez RN   [facility]

## 2021-02-17 ENCOUNTER — VIRTUAL VISIT (OUTPATIENT)
Dept: FAMILY MEDICINE | Facility: CLINIC | Age: 31
End: 2021-02-17
Payer: COMMERCIAL

## 2021-02-17 DIAGNOSIS — F31.81 BIPOLAR 2 DISORDER (H): Primary | ICD-10-CM

## 2021-02-17 DIAGNOSIS — F33.1 MODERATE EPISODE OF RECURRENT MAJOR DEPRESSIVE DISORDER (H): ICD-10-CM

## 2021-02-17 DIAGNOSIS — F41.1 GAD (GENERALIZED ANXIETY DISORDER): ICD-10-CM

## 2021-02-17 PROCEDURE — 99214 OFFICE O/P EST MOD 30 MIN: CPT | Mod: 95 | Performed by: NURSE PRACTITIONER

## 2021-02-17 RX ORDER — LAMOTRIGINE 200 MG/1
200 TABLET ORAL DAILY
Qty: 90 TABLET | Refills: 1 | Status: SHIPPED | OUTPATIENT
Start: 2021-02-17 | End: 2021-09-13

## 2021-02-17 SDOH — HEALTH STABILITY: MENTAL HEALTH: HOW MANY STANDARD DRINKS CONTAINING ALCOHOL DO YOU HAVE ON A TYPICAL DAY?: 1 OR 2

## 2021-02-17 SDOH — HEALTH STABILITY: MENTAL HEALTH: HOW OFTEN DO YOU HAVE 6 OR MORE DRINKS ON ONE OCCASION?: NEVER

## 2021-02-17 SDOH — HEALTH STABILITY: MENTAL HEALTH: HOW OFTEN DO YOU HAVE A DRINK CONTAINING ALCOHOL?: 2-4 TIMES A MONTH

## 2021-02-17 NOTE — PATIENT INSTRUCTIONS
If you are drinking 1-2 days a week, then limit to two drinks and if more, then 1 drink    COVID testing - you can always contact me with questions or concerns and we do have testing at [place]  I recommend [facility] covid testing for accessibility and turn around  In person testing (quicker, but around other people) - https://[place].gov/covid19/get-tested/testing-locations/community-testing.jsp  At home testing (a bit slower, but stay at home) - https://www.health.[facility].[place]./diseases/coronavirus/testsites/athome.html    With the new variants that are more contagious we do not yet know if our current precautions are sufficient.  Based on reports from the [place] suggesting that they might not be sufficient, I now recommend the following measures:  1) Get a well fitting surgical mask rather than a cloth mask (cloth masks have been effective with current strain, but were not meant to be long-term solution and it is unclear if they are effective with newer strains).  Look for masks that are [employer] [place] certified.   - KN95 are some options and this is one company in [place] with several good masks options -                      https://[employer]ags/   - this company has N95 masks which are pricier, but their Respokare version is reusable and self-sanitizing    [email or web address]://[email or web address].Pinshape/ or Likelii.Pinshape    https://www.[employer]/2021/02/17/technology/[employer]ly[employer]ech/buy-real-n95-mask.html?campaign_id=9&emc=edit_nn_20210216&instance_id=86367&nl=the-morning&regi_id=[id number]&segment_id=57582&te=1&user_id=[id number]    2) Do not enter stores or other indoor places unless absolutely needed.  Wear masks in situations you might not have previously (on a walk outside, for example)    3) Vaccine - the Pfizer and Moderna vaccines are essentially 100% effective.  Out of 32,000 people in the trials only 1 person who had been vaccinated got severe covid.  At this point, I expect that [facility] will be the source for  when a vaccine is available to the general public.         Occupational Therapy Evaluate and Treat

## 2021-02-17 NOTE — PROGRESS NOTES
Ambrosio is a 30 year old who is being evaluated via a billable telephone visit.      What phone number would you like to be contacted at? [phone]  How would you like to obtain your AVS? Madihart    Assessment & Plan     (F31.81) Bipolar 2 disorder (H)  (primary encounter diagnosis)  Comment:   Plan: lamoTRIgine (LAMICTAL) 200 MG tablet            (F41.1) TODD (generalized anxiety disorder)  Comment:   Plan: lamoTRIgine (LAMICTAL) 200 MG tablet            (F33.1) Moderate episode of recurrent major depressive disorder (H)  Comment:   Plan: lamoTRIgine (LAMICTAL) 200 MG tablet                34 minutes spent on the date of the encounter doing chart review, history and exam, documentation and further activities as noted above    Patient Instructions   If you are drinking 1-2 days a week, then limit to two drinks and if more, then 1 drink    COVID testing - you can always contact me with questions or concerns and we do have testing at [place]  I recommend [facility] covid testing for accessibility and turn around  In person testing (quicker, but around other people) - https://[place].gov/covid19/get-tested/testing-locations/community-testing.jsp  At home testing (a bit slower, but stay at home) - https://www.health.[facility].[place].us/diseases/coronavirus/testsites/athome.html    With the new variants that are more contagious we do not yet know if our current precautions are sufficient.  Based on reports from the [place] suggesting that they might not be sufficient, I now recommend the following measures:  1) Get a well fitting surgical mask rather than a cloth mask (cloth masks have been effective with current strain, but were not meant to be long-term solution and it is unclear if they are effective with newer strains).  Look for masks that are M-SIX certified.   - KN95 are some options and this is one company in [place] with several good masks options -                      https://[employer]/   - this company has N95 masks which are  "pricier, but their Respokare version is reusable and self-sanitizing    https://RE2.Applause/ or eSeekers.Applause    https://www.[employer]/2021/02/17/technology/personaltech/buy-real-n95-mask.html?campaign_id=9&emc=edit_nn_20210216&instance_id=45349&nl=the-morning&regi_id=[id number]&segment_id=66024&te=1&user_id=[id number]    2) Do not enter stores or other indoor places unless absolutely needed.  Wear masks in situations you might not have previously (on a walk outside, for example)    3) Vaccine - the Pfizer and Moderna vaccines are essentially 100% effective.  Out of 32,000 people in the trials only 1 person who had been vaccinated got severe covid.  At this point, I expect that [facility] will be the source for when a vaccine is available to the general public.            Return in about 6 months (around 8/17/2021) for mental health check.    JAC Javed [facility]    Darren Valente is a 30 year old who presents for the following health issues  accompanied by his self:    HPI     Depression and Anxiety and Bipolar Follow-Up    How are you doing with your depression since your last visit? No change - stable without depressive episode - \"nothing extreme\"    How are you doing with your anxiety since your last visit?  No change    No richard episode    Are you having other symptoms that might be associated with depression or anxiety? No    Have you had a significant life event? OTHER: pandemic     Do you have any concerns with your use of alcohol or other drugs? No    Hasn't been at work at [employer] since March 2020 and paying for expenses with unemployment, but will return to position when able.  Able to stay home and safe.  Roommate got his covid vaccine.  Only sees boyfriend who is very cautious and a couple of trusted friends.  Does go to the grocery store.    Did a sober month mid-Jan through now - it has been \"nice " "and feels good.\"  After dry month, plans to drink, but not as much.  In 2019 - would drink 2-3 days a week up to 6 drinks on those days.    Has mysterious bald spot on back of his head - happened a year ago out of nowhere.  The size is a little bigger than a quarter and slightly oval.  The scalp skin looks normal.  Hair seems to have started growing back.    Declines STI screening    Social History     Tobacco Use     Smoking status: Never Smoker     Smokeless tobacco: Never Used   Substance Use Topics     Alcohol use: Yes     Drug use: No     PHQ 9/27/2017 1/3/2018 8/1/2018   PHQ-9 Total Score 7 4 4   Q9: Thoughts of better off dead/self-harm past 2 weeks Not at all Not at all Not at all     TODD-7 SCORE 9/27/2017 1/3/2018 8/1/2018   Total Score 6 4 4     Suicide Assessment Five-step Evaluation and Treatment (SAFE-T)      How many days per week do you exercise enough to make your heart beat faster? 3 or less  Bought free weights.  Misses going to gym and too cold outside.    Estimates that weight is down in the past couple months  How many days per week do you miss taking your medication? 1    What makes it hard for you to take your medications?  remembering to take    I have reviewed and updated the patient's Past Medical History, Social History, Family History and Medication List    Review of Systems     ROS:5 point ROS including CONST, HEENT, Respiratory, CV, and GI other than that noted in the HPI,  is negative         Objective           Vitals:  No vitals were obtained today due to virtual visit.    Physical Exam   healthy, alert and no distress  PSYCH: Alert and oriented times 3; coherent speech, normal   rate and volume, able to articulate logical thoughts, able   to abstract reason, no tangential thoughts, no hallucinations   or delusions  His affect is normal and pleasant  RESP: No cough, no audible wheezing, able to talk in full sentences  Remainder of exam unable to be completed due to telephone " visits          End:  5:32 PM   Start:  5:02 PM   Phone call duration: 30 minutes

## 2021-02-17 NOTE — Clinical Note
This should be a video visit, not telephone visit as we cannot bill for telephone visit.  He doesn't need any special juice or to get into [employer] to be able to have a video visit.  I do not believe that central scheduling made that clear.  I can video chat with him via [employer] as long as he has a smart phone.  Can you please change to video visit?  JERSEY Izquierdo

## 2021-04-18 ENCOUNTER — HEALTH MAINTENANCE LETTER (OUTPATIENT)
Age: 31
End: 2021-04-18

## 2021-09-10 DIAGNOSIS — F33.1 MODERATE EPISODE OF RECURRENT MAJOR DEPRESSIVE DISORDER (H): ICD-10-CM

## 2021-09-10 DIAGNOSIS — F31.81 BIPOLAR 2 DISORDER (H): ICD-10-CM

## 2021-09-10 DIAGNOSIS — F41.1 GAD (GENERALIZED ANXIETY DISORDER): ICD-10-CM

## 2021-09-10 NOTE — TELEPHONE ENCOUNTER
Reason for Call:  Medication or medication refill:    Do you use a [facility]?  Name of the pharmacy and phone number for the current request:    [facility]/PHARMACY #[id number] - [place], [place] - [address] (Pharmacy) [phone]         Name of the medication requested:   lamoTRIgine (LAMICTAL) 200 MG tablet    Other request: NA    Can we leave a detailed message on this number? YES    Phone number patient can be reached at: Cell number on file:    Telephone Information:   Mobile [phone]       Best Time: ANY    Call taken on 9/10/2021 at 1:15 PM by Ewelina Self MA

## 2021-09-11 ENCOUNTER — NURSE TRIAGE (OUTPATIENT)
Dept: NURSING | Facility: CLINIC | Age: 31
End: 2021-09-11

## 2021-09-11 NOTE — TELEPHONE ENCOUNTER
Refill request:    Pt calling for Lamotrigine refill  [facility] called to let pt know that they needed to get a hold of pt's PCP and they have not heard back yet. Writer reviewed notes and let pt know that it takes up to 3 business days for refills and that the message yesterday was routed to the refill team. [facility] is able to give pt a supply to last him thru Monday.     Laila Olivares RN  [facility] Nurse Advisor 12:25 PM 9/11/2021      Reason for Disposition    [1] Prescription prescribed recently is not at pharmacy AND [2] triager has access to patient's EMR AND [3] prescription is recorded in the EMR    Additional Information    Negative: Drug overdose and triager unable to answer question    Negative: Caller requesting information unrelated to medicine    Negative: Caller requesting a prescription for Strep throat and has a positive culture result    Negative: Rash while taking a medication or within 3 days of stopping it    Negative: Immunization reaction suspected    Negative: [1] Asthma and [2] having symptoms of asthma (cough, wheezing, etc.)    Negative: [1] Influenza symptoms AND [2] anti-viral med prescription request, such as Tamiflu    Negative: [1] Symptom of illness (e.g., headache, abdominal pain, earache, vomiting) AND [2] more than mild    Negative: MORE THAN A DOUBLE DOSE of a prescription or over-the-counter (OTC) drug    Negative: [1] DOUBLE DOSE (an extra dose or lesser amount) of over-the-counter (OTC) drug AND [2] any symptoms (e.g., dizziness, nausea, pain, sleepiness)    Negative: [1] DOUBLE DOSE (an extra dose or lesser amount) of prescription drug AND [2] any symptoms (e.g., dizziness, nausea, pain, sleepiness)    Negative: Took another person's prescription drug    Negative: [1] DOUBLE DOSE (an extra dose or lesser amount) of prescription drug AND [2] NO symptoms (Exception: a double dose of antibiotics)    Negative: Diabetes drug error or overdose (e.g., took wrong type of insulin or took  "extra dose)    Negative: [1] Request for URGENT new prescription or refill of \"essential\" medication (i.e., likelihood of harm to patient if not taken) AND [2] triager unable to fill per unit policy    Negative: [1] Prescription not at pharmacy AND [2] was prescribed by PCP recently    Negative: [1] Pharmacy calling with prescription questions AND [2] triager unable to answer question    Negative: [1] Caller has URGENT medication question about med that PCP or specialist prescribed AND [2] triager unable to answer question    Negative: [1] Caller has NON-URGENT medication question about med that PCP prescribed AND [2] triager unable to answer question    Negative: [1] Caller requesting a NON-URGENT new prescription or refill AND [2] triager unable to refill per unit policy    Negative: [1] Caller has medication question about med not prescribed by PCP AND [2] triager unable to answer question (e.g., compatibility with other med, storage)    Negative: Caller requesting a CONTROLLED substance prescription refill (e.g., narcotics, ADHD medicines)    Negative: Caller wants to use a complementary or alternative medicine    Protocols used: MEDICATION QUESTION CALL-A-    COVID 19 Nurse Triage Plan/Patient Instructions    Please be aware that novel coronavirus (COVID-19) may be circulating in the community. If you develop symptoms such as fever, cough, or SOB or if you have concerns about the presence of another infection including coronavirus (COVID-19), please contact your health care provider or visit [email or web address].     Disposition/Instructions    Home care recommended. Follow home care protocol based instructions.    Thank you for taking steps to prevent the spread of this virus.  o Limit your contact with others.  o Wear a simple mask to cover your cough.  o Wash your hands well and often.    Resources    [facility]: About COVID-19: [email or web address]    CDC: What to Do If You're " Sick: www.cdc.gov/coronavirus/2019-ncov/about/steps-when-sick.html    CDC: Ending Home Isolation: www.cdc.gov/coronavirus/2019-ncov/hcp/disposition-in-home-patients.html     CDC: Caring for Someone: www.cdc.gov/coronavirus/2019-ncov/if-you-are-sick/care-for-someone.html     [facility]: Interim Guidance for Hospital Discharge to Home: www.[facility].mn./diseases/coronavirus/hcp/hospdischarge.pdf    [facility] clinical trials (COVID-19 research studies): clinicalaffairs.[facility]-clinical-trials     Below are the COVID-19 hotlines at the [place] Department of Health ([facility]). Interpreters are available.   o For health questions: Call 854-013-5514 or 1-593.435.7860 (7 a.m. to 7 p.m.)  o For questions about schools and childcare: Call 943-453-1446 or [phone] (7 a.m. to 7 p.m.)

## 2021-09-13 RX ORDER — LAMOTRIGINE 200 MG/1
200 TABLET ORAL DAILY
Qty: 90 TABLET | Refills: 1 | Status: SHIPPED | OUTPATIENT
Start: 2021-09-13 | End: 2022-04-12

## 2021-10-03 ENCOUNTER — HEALTH MAINTENANCE LETTER (OUTPATIENT)
Age: 31
End: 2021-10-03

## 2021-11-18 ENCOUNTER — NURSE TRIAGE (OUTPATIENT)
Dept: NURSING | Facility: CLINIC | Age: 31
End: 2021-11-18
Payer: COMMERCIAL

## 2021-11-19 NOTE — TELEPHONE ENCOUNTER
Cyst on back for a while.  Last couple weeks sensitive to touch.  Had off today and wanted to be seen.  Per protocol should be seen in the next 2-3 days.  Plan was made to send to scheduling.  If they have an appointment that doesn't conflict with work he will take it, if not he will plan to go to .    Jeannette Moeller RN   11/18/21 7:03 PM  [facility] Nurse Advisor      Reason for Disposition    [1] Small swelling or lump AND [2] unexplained AND [3] present > 1 week    Additional Information    Negative: Sounds like a life-threatening emergency to the triager    Negative: Small growth, spot, bump, or pigmented area of skin (e.g., moles, skin tags, wart, melanoma, skin cancer)    Negative: Inguinal hernia previously diagnosed by a physician    Negative: Followed a skin injury    Negative: Follows an insect bite    Negative: Swelling of lymph node suspected    Negative: Swelling of vaccination site    Negative: Swelling of tongue    Negative: Swelling of lip    Negative: Swelling of eye    Negative: Swelling of entire face    Negative: Swelling of scrotum    Negative: Swelling of labia    Negative: Swelling of surgical incision    Negative: Swelling of ankle joint    Negative: Swelling of elbow joint    Negative: Swelling of knee joint    Negative: Swelling with a skin rash    Negative: Patient sounds very sick or weak to the triager    Negative: SEVERE pain (e.g., excruciating)    Negative: [1] Swelling is painful to touch AND [2] fever    Negative: [1] Swelling is red AND [2] fever    Negative: [1] Swelling is red AND [2] size > 2 inches (5.0 cm) (Exception: itchy area of skin)    Negative: [1] Swelling of groin (inguinal area) AND [2] painful    Negative: [1] Swelling is painful to touch AND [2] no fever    Negative: Looks like a boil, infected sore, deep ulcer or other infected rash    Protocols used: SKIN LUMP OR LOCALIZED SWELLING-A-

## 2022-01-22 ENCOUNTER — APPOINTMENT (OUTPATIENT)
Dept: URGENT CARE | Facility: CLINIC | Age: 32
End: 2022-01-22

## 2022-04-11 ENCOUNTER — TELEPHONE (OUTPATIENT)
Dept: FAMILY MEDICINE | Facility: CLINIC | Age: 32
End: 2022-04-11
Payer: COMMERCIAL

## 2022-04-11 DIAGNOSIS — F31.81 BIPOLAR 2 DISORDER (H): ICD-10-CM

## 2022-04-11 DIAGNOSIS — F41.1 GAD (GENERALIZED ANXIETY DISORDER): ICD-10-CM

## 2022-04-11 DIAGNOSIS — F33.1 MODERATE EPISODE OF RECURRENT MAJOR DEPRESSIVE DISORDER (H): ICD-10-CM

## 2022-04-11 NOTE — TELEPHONE ENCOUNTER
"Venessa,     Patient schedule visit with you but no available dates any sooner than May 17th. Needing med refill.     Requested Prescriptions   Pending Prescriptions Disp Refills     lamoTRIgine (LAMICTAL) 200 MG tablet [Pharmacy Med Name: LAMOTRIGINE 200 MG TABLET] 90 tablet 1     Sig: TAKE 1 TABLET (200 MG) BY MOUTH DAILY       Anti-Seizure Meds Protocol  Failed - 4/11/2022  3:02 PM        Failed - Recent (12 mo) or future (30 days) visit within the authorizing provider's specialty     Patient has had an office visit with the authorizing provider or a provider within the authorizing providers department within the previous 12 mos or has a future within next 30 days. See \"Patient Info\" tab in inbasket, or \"Choose Columns\" in Meds & Orders section of the refill encounter.              Failed - Review Authorizing provider's last note.      Refer to last progress notes: confirm request is for original authorizing provider (cannot be through other providers).          Failed - Normal CBC on file in past 26 months     Recent Labs   Lab Test 05/02/18  1057   WBC 5.8   RBC 5.15   HGB 16.9   HCT 49.5                    Failed - Normal platelet count on file in past 26 months     Recent Labs   Lab Test 05/02/18  1057                  Passed - Normal ALT or AST on file in past 26 months     Recent Labs   Lab Test 08/03/20  1514   ALT 38     Recent Labs   Lab Test 08/03/20  1514   AST 44             Passed - Medication is active on med list           Alyson Koch RN  [facility]     "

## 2022-04-12 RX ORDER — LAMOTRIGINE 200 MG/1
200 TABLET ORAL DAILY
Qty: 90 TABLET | Refills: 0 | Status: SHIPPED | OUTPATIENT
Start: 2022-04-12 | End: 2022-07-11

## 2022-05-15 ENCOUNTER — HEALTH MAINTENANCE LETTER (OUTPATIENT)
Age: 32
End: 2022-05-15

## 2022-05-17 ENCOUNTER — OFFICE VISIT (OUTPATIENT)
Dept: FAMILY MEDICINE | Facility: CLINIC | Age: 32
End: 2022-05-17
Payer: COMMERCIAL

## 2022-05-17 VITALS
SYSTOLIC BLOOD PRESSURE: 131 MMHG | DIASTOLIC BLOOD PRESSURE: 89 MMHG | BODY MASS INDEX: 33.77 KG/M2 | OXYGEN SATURATION: 97 % | TEMPERATURE: 97.2 F | HEART RATE: 93 BPM | HEIGHT: 69 IN | WEIGHT: 228 LBS

## 2022-05-17 DIAGNOSIS — Z20.822 EXPOSURE TO 2019 NOVEL CORONAVIRUS: ICD-10-CM

## 2022-05-17 DIAGNOSIS — J02.9 SORE THROAT: ICD-10-CM

## 2022-05-17 DIAGNOSIS — F33.1 MODERATE EPISODE OF RECURRENT MAJOR DEPRESSIVE DISORDER (H): ICD-10-CM

## 2022-05-17 DIAGNOSIS — Z11.59 NEED FOR HEPATITIS C SCREENING TEST: ICD-10-CM

## 2022-05-17 DIAGNOSIS — F41.1 GAD (GENERALIZED ANXIETY DISORDER): ICD-10-CM

## 2022-05-17 DIAGNOSIS — R03.0 ELEVATED BLOOD PRESSURE READING WITHOUT DIAGNOSIS OF HYPERTENSION: ICD-10-CM

## 2022-05-17 DIAGNOSIS — F31.81 BIPOLAR 2 DISORDER (H): ICD-10-CM

## 2022-05-17 DIAGNOSIS — Z00.00 ROUTINE GENERAL MEDICAL EXAMINATION AT A HEALTH CARE FACILITY: Primary | ICD-10-CM

## 2022-05-17 PROCEDURE — U0003 INFECTIOUS AGENT DETECTION BY NUCLEIC ACID (DNA OR RNA); SEVERE ACUTE RESPIRATORY SYNDROME CORONAVIRUS 2 (SARS-COV-2) (CORONAVIRUS DISEASE [COVID-19]), AMPLIFIED PROBE TECHNIQUE, MAKING USE OF HIGH THROUGHPUT TECHNOLOGIES AS DESCRIBED BY CMS-2020-01-R: HCPCS | Performed by: NURSE PRACTITIONER

## 2022-05-17 PROCEDURE — 99395 PREV VISIT EST AGE 18-39: CPT | Performed by: NURSE PRACTITIONER

## 2022-05-17 PROCEDURE — U0005 INFEC AGEN DETEC AMPLI PROBE: HCPCS | Performed by: NURSE PRACTITIONER

## 2022-05-17 PROCEDURE — 99214 OFFICE O/P EST MOD 30 MIN: CPT | Mod: CS | Performed by: NURSE PRACTITIONER

## 2022-05-17 PROCEDURE — 96127 BRIEF EMOTIONAL/BEHAV ASSMT: CPT | Mod: 59 | Performed by: NURSE PRACTITIONER

## 2022-05-17 RX ORDER — LORAZEPAM 1 MG/1
1 TABLET ORAL DAILY PRN
Qty: 20 TABLET | Refills: 0 | Status: SHIPPED | OUTPATIENT
Start: 2022-05-17 | End: 2022-07-11

## 2022-05-17 ASSESSMENT — PATIENT HEALTH QUESTIONNAIRE - PHQ9
SUM OF ALL RESPONSES TO PHQ QUESTIONS 1-9: 5
10. IF YOU CHECKED OFF ANY PROBLEMS, HOW DIFFICULT HAVE THESE PROBLEMS MADE IT FOR YOU TO DO YOUR WORK, TAKE CARE OF THINGS AT HOME, OR GET ALONG WITH OTHER PEOPLE: NOT DIFFICULT AT ALL
SUM OF ALL RESPONSES TO PHQ QUESTIONS 1-9: 5

## 2022-05-17 ASSESSMENT — ANXIETY QUESTIONNAIRES
2. NOT BEING ABLE TO STOP OR CONTROL WORRYING: NOT AT ALL
5. BEING SO RESTLESS THAT IT IS HARD TO SIT STILL: SEVERAL DAYS
3. WORRYING TOO MUCH ABOUT DIFFERENT THINGS: NOT AT ALL
GAD7 TOTAL SCORE: 2
7. FEELING AFRAID AS IF SOMETHING AWFUL MIGHT HAPPEN: NOT AT ALL
8. IF YOU CHECKED OFF ANY PROBLEMS, HOW DIFFICULT HAVE THESE MADE IT FOR YOU TO DO YOUR WORK, TAKE CARE OF THINGS AT HOME, OR GET ALONG WITH OTHER PEOPLE?: SOMEWHAT DIFFICULT
7. FEELING AFRAID AS IF SOMETHING AWFUL MIGHT HAPPEN: NOT AT ALL
4. TROUBLE RELAXING: NOT AT ALL
GAD7 TOTAL SCORE: 2
1. FEELING NERVOUS, ANXIOUS, OR ON EDGE: SEVERAL DAYS
GAD7 TOTAL SCORE: 2
6. BECOMING EASILY ANNOYED OR IRRITABLE: NOT AT ALL

## 2022-05-17 NOTE — PATIENT INSTRUCTIONS
"Check blood pressure at home at least three times a week for the next 2-4 weeks.  Keep a record and then send me the record via [employer] in 2-4 weeks.  It can be at any time of day.  Be sitting for five minutes  Legs and feet uncrossed (feet flat on the floor)  Not having had caffeine in the past thirty minutes  Not talking    Check with insurance about which vendor you'll get the best price    Schedule with pharmacist to fine tune bipolar medications      1.\"Eat food.  Not too much.  Mostly plants\" - Claude Pollen - see link below  - Aim for 5-7 servings of vegetables (raw vegetables - 1 serving = 1/2 cup; raw greens - 1 serving = 1 cup)   - Eat grains, but don't make them the superstar of your meal and DO make them whole grains  2. AVOID sugar.  There is no nutritional benefit to sugar.  3. Drink lots of water (avoid juice and soda)  4. MOVE your body daily - even if some days this means 5 minutes of movement. Walking is great for your bones and brain.  Do aim for 150 minutes per week of activity that raises your heart rate, but \"don't let the ideal get in the way of the good enough\"  5. Get good sleep (6-8 hours/night- less sleep is associated with disease/illness/obesity)   6. Smile and laugh every day - even in the face of tragedy  7. Start a meditation or mindfulness practice    CALCIUM: The average recommended intake for women is 8932-1354 mg calcium daily. It is best to get this from your diet (Milk, yogurt, and cheese, vegetables such as Chinese cabbage, kale, spinach and broccoli). If you are not eating enough calcium, then I recommend Calcium Citrate/vitD supplements daily.     Vitamin D is an essential nutritient that many [place] lack, especially in the winter. It is vital for healthy immune system functioning and can be linked to diseases such as obesity, diabetes, body aches/pain, etc. It is super safe and highly beneficial for a [place] to take a vitamin D3 2000 IU once daily during winter " "months. Daily vitamin D for life is recommended for anyone who has ever been found deficient.    Other things to consider: do not drive while under the influence of alcohol, do not drive while texting, always wear a seatbelt, wear a helmet when biking, wear sunscreen to help prevent skin cancer, use DEET mosquito spray when outdoors to prevent mosquito and tick bites, & avoid smoking. If you do get bit by a DEER tick, please contact my office immediately to consider lyme prophylaxis. Dental visits recommended every 6-12 months.    Claude Barrett's 7 Rules for Eating  https://www.babbel/food-recipes/news/91355773/7-rules-for-eating#1    My clinic hours are as follows:  Monday virtual appointments 12-2p  Tuesday in clinic appts 8a-5p  Thur virtual 7-9 am  If you need an appointment urgently and do not find one available on [employer] or with Central scheduling, please send me a [employer] message and I will work to get you scheduled with myself or a colleague here     Clinic notes  Lab and imaging results are now available for you to view immediately on completion.  Please know that I often have not seen the result before you see results.  I will interpret and discuss the results and meaning of the results as soon as I am able to review them.   [employer] is the best way to reach the clinic directly.  When you send a [employer] message this will be read by our clinic RNs.  Please know that when you call the clinic number, you are not speaking to a staff member from our local clinic.  You can ask that staff to speak to a clinic staff directly if you wish.  You will be able to read my documentation (\"provider notes\") when I finish up and close your chart.  I encourage you to read through to understand your diagnosis and the plan and how we arrived there.  It is also an opportunity to make sure I fully understood your concerns.      Preventive Health Recommendations  Male Ages 26 - 39    Yearly exam:             See your health " care provider every year in order to  o   Review health changes.   o   Discuss preventive care.    o   Review your medicines if your doctor has prescribed any.  You should be tested each year for STDs (sexually transmitted diseases), if you re at risk.   After age 35, talk to your provider about cholesterol testing. If you are at risk for heart disease, have your cholesterol tested at least every 5 years.   If you are at risk for diabetes, you should have a diabetes test (fasting glucose).  Shots: Get a flu shot each year. Get a tetanus shot every 10 years.     Nutrition:  Eat at least 5 servings of fruits and vegetables daily.   Eat whole-grain bread, whole-wheat pasta and brown rice instead of white grains and rice.   Get adequate Calcium and Vitamin D.     Lifestyle  Exercise for at least 150 minutes a week (30 minutes a day, 5 days a week). This will help you control your weight and prevent disease.   Limit alcohol to one drink per day.   No smoking.   Wear sunscreen to prevent skin cancer.   See your dentist every six months for an exam and cleaning.

## 2022-05-17 NOTE — PROGRESS NOTES
SUBJECTIVE:   CC: Ambrosio Kemp is an 31 year old male who presents for preventative health visit.       Patient has been advised of split billing requirements and indicates understanding: Yes  Healthy Habits:     Getting at least 3 servings of Calcium per day:  Yes    Bi-annual eye exam:  Yes    Dental care twice a year:  NO    Sleep apnea or symptoms of sleep apnea:  Excessive snoring    Diet:  Vegetarian/vegan    Frequency of exercise:  4-5 days/week    Duration of exercise:  30-45 minutes    Taking medications regularly:  Yes    Medication side effects:  None    PHQ-2 Total Score: 0    Additional concerns today:  Yes  Answers for HPI/ROS submitted by the patient on 5/17/2022  If you checked off any problems, how difficult have these problems made it for you to do your work, take care of things at home, or get along with other people?: Not difficult at all  PHQ9 TOTAL SCORE: 5  TODD 7 TOTAL SCORE: 2  MDQ- not meeting diagnostic threshold    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  Likes the work and making better money.  Living by himself in [facility] - considering moving in with boyfriend.  Walking for exercise.    COVID-19  03/13/2021  1 of 2  Comirnaty-PFR 30mcg  Full    No    COVID-19  03/31/2021  2 of 2  Comirnaty-PFR 30mcg  Full    No    COVID-19  01/24/2022  3 of 2  Comirnaty-PFR 30mcg  Full    No     COVID - has had multiple exposures at work and negative home rapid test.  Having sore throat, congestion, ear pain, occasional sneezing and productive cough for the past week.  Denies fever/chills, wheezing, SOB, chest pain, n/v/d, or lost of taste/smell    Mental health - over past six months, has had a couple episodes of richard lasting 3-4 days and an episode of depression lasting 2-4 days.  Has baseline of anxiety that is better than prior to lamotrigine. No panic attacks. Symptoms worsen with stress, lack of sleep, alcohol intake.  Not concerned about current alcohol intake.  Some  nights has 2-3 drinks after work and night not drinking at all.  No SI since on lamotrigine.  Hasn't had lorazepam or needing this recently.  Not currently seeing a therapist.  Wondering about seeing psychiatry for a better handle on medication and insight on what might be more helpful.    BP Readings from Last 6 Encounters:   05/17/22 131/89   11/20/19 136/84   10/31/18 136/88   08/01/18 136/86   05/02/18 132/86   01/03/18 142/90   Snores.  Doesn't have episodes of stopping breathing    Today's PHQ-2 Score:   PHQ-2 ( 1999 Pfizer) 5/17/2022   Q1: Little interest or pleasure in doing things 0   Q2: Feeling down, depressed or hopeless 0   PHQ-2 Score 0   PHQ-2 Total Score (12-17 Years)- Positive if 3 or more points; Administer PHQ-A if positive -   Q1: Little interest or pleasure in doing things Not at all   Q2: Feeling down, depressed or hopeless Not at all   PHQ-2 Score 0       Abuse: Current or Past(Physical, Sexual or Emotional)- No  Do you feel safe in your environment? Yes    Have you ever done Advance Care Planning? (For example, a Health Directive, POLST, or a discussion with a medical provider or your loved ones about your wishes): No, advance care planning information given to patient to review.  Patient declined advance care planning discussion at this time.    Social History     Tobacco Use     Smoking status: Never Smoker     Smokeless tobacco: Never Used   Substance Use Topics     Alcohol use: Yes     If you drink alcohol do you typically have >3 drinks per day or >7 drinks per week? Yes      Alcohol Use 5/17/2022   Prescreen: >3 drinks/day or >7 drinks/week? Yes   Prescreen: >3 drinks/day or >7 drinks/week? -   AUDIT SCORE  7     AUDIT - Alcohol Use Disorders Identification Test - Reproduced from the World Health Organization Audit 2001 (Second Edition) 5/17/2022   1.  How often do you have a drink containing alcohol? 2 to 3 times a week   2.  How many drinks containing alcohol do you have on a typical  day when you are drinking? 5 or 6   3.  How often do you have five or more drinks on one occasion? Less than monthly   4.  How often during the last year have you found that you were not able to stop drinking once you had started? Never   5.  How often during the last year have you failed to do what was normally expected of you because of drinking? Never   6.  How often during the last year have you needed a first drink in the morning to get yourself going after a heavy drinking session? Never   7.  How often during the last year have you had a feeling of guilt or remorse after drinking? Less than monthly   8.  How often during the last year have you been unable to remember what happened the night before because of your drinking? Never   9.  Have you or someone else been injured because of your drinking? No   10. Has a relative, friend, doctor or other health care worker been concerned about your drinking or suggested you cut down? No   TOTAL SCORE 7       Roomed by MARIN Mckeon      Last PSA: No results found for: PSA    Reviewed orders with patient. Reviewed health maintenance and updated orders accordingly - Yes  Lab work is in process  Labs reviewed in EPIC    Reviewed and updated as needed this visit by clinical staff   Tobacco  Allergies   Problems  Med Hx  Surg Hx  Fam Hx  Soc Hx          Reviewed and updated as needed this visit by Provider   Tobacco    Problems    Fam Hx  Soc Hx             Review of Systems  CONSTITUTIONAL: NEGATIVE for fever, chills, change in weight  INTEGUMENTARY/SKIN: NEGATIVE for worrisome rashes, moles or lesions  EYES: NEGATIVE for vision changes or irritation  ENT: NEGATIVE for ear, mouth and throat problems  RESP: NEGATIVE for significant cough or SOB  CV: NEGATIVE for chest pain, palpitations or peripheral edema  GI: NEGATIVE for nausea, abdominal pain, heartburn, or change in bowel habits   male: negative for dysuria, hematuria, decreased urinary stream, erectile  "dysfunction, urethral discharge  MUSCULOSKELETAL: NEGATIVE for significant arthralgias or myalgia  NEURO: NEGATIVE for weakness, dizziness or paresthesias  PSYCHIATRIC: NEGATIVE for changes in mood or affect    OBJECTIVE:   /89 (BP Location: Left arm, Patient Position: Sitting, Cuff Size: Adult Regular)   Pulse 93   Temp 97.2  F (36.2  C) (Temporal)   Ht 1.76 m (5' 9.29\")   Wt 103.4 kg (228 lb)   SpO2 97%   BMI 33.39 kg/m      Physical Exam  GENERAL: healthy, alert and no distress  EYES: Eyes grossly normal to inspection, PERRL and conjunctivae and sclerae normal  HENT: ear canals and TM's normal, nose and mouth without ulcers or lesions; erythematous oropharynx with tonsillar hypertrophy  NECK: no adenopathy, no asymmetry, masses, or scars and thyroid normal to palpation  RESP: lungs clear to auscultation - no rales, rhonchi or wheezes  CV: regular rate and rhythm, normal S1 S2, no S3 or S4, no murmur, click or rub, no peripheral edema and peripheral pulses strong  ABDOMEN: soft, nontender, no hepatosplenomegaly, no masses and bowel sounds normal   (male): normal male genitalia without lesions or urethral discharge, no hernia  MS: no gross musculoskeletal defects noted, no edema  SKIN: no suspicious lesions or rashes  NEURO: Normal strength and tone, mentation intact and speech normal  PSYCH: mentation appears normal, affect normal/bright  LYMPH: no cervical, supraclavicular, axillary, or inguinal adenopathy    Diagnostic Test Results:  Labs reviewed in Epic  Pending  STOP BANG score 2-3 (two without HTN dx and 3 with HTN dx, neck circumference borderline)  138/92    ASSESSMENT/PLAN:   (Z00.00) Routine general medical examination at a health care facility  (primary encounter diagnosis)  Comment:   Plan:     (F31.81) Bipolar 2 disorder (H)  Comment:   Plan: Med Therapy Management Referral        Looking for improvement in management - ok with MTM    (Z11.59) Need for hepatitis C screening " "test  Comment:   Plan: Hepatitis C Screen Reflex to HCV RNA Quant and         Genotype            (F41.1) TODD (generalized anxiety disorder)  Comment:   Plan: Med Therapy Management Referral, LORazepam         (ATIVAN) 1 MG tablet            (F33.1) Moderate episode of recurrent major depressive disorder (H)  Comment:   Plan: Med Therapy Management Referral            (Z20.822) Exposure to 2019 novel coronavirus  Comment:   Plan: Symptomatic; Yes; 5/10/2022 COVID-19 Virus         (Coronavirus) by PCR            (R03.0) Elevated blood pressure reading without diagnosis of hypertension  Comment:   Plan: Home Blood Pressure Monitor Order for DME -         ONLY FOR DME            (J02.9) Sore throat  Comment:   Plan: COVID test today    Patient has been advised of split billing requirements and indicates understanding: Yes    COUNSELING:   Reviewed preventive health counseling, as reflected in patient instructions    Estimated body mass index is 33.39 kg/m  as calculated from the following:    Height as of this encounter: 1.76 m (5' 9.29\").    Weight as of this encounter: 103.4 kg (228 lb).     Weight management plan: Discussed healthy diet and exercise guidelines    He reports that he has never smoked. He has never used smokeless tobacco.    Counseling Resources:  ATP IV Guidelines  Pooled Cohorts Equation Calculator  FRAX Risk Assessment  ICSI Preventive Guidelines  Dietary Guidelines for Americans, 2010  USDA's MyPlate  ASA Prophylaxis  Lung CA Screening    JAC Javed CNP  [facility]  "

## 2022-05-17 NOTE — Clinical Note
COVID-19  03/13/2021  1 of 2  Comirnaty-PFR 30mcg  Full    No  COVID-19  03/31/2021  2 of 2  Comirnaty-PFR 30mcg  Full    No  COVID-19  01/24/2022  3 of 2  Comirnaty-PFR 30mcg  Full    No

## 2022-05-18 ENCOUNTER — MYC MEDICAL ADVICE (OUTPATIENT)
Dept: FAMILY MEDICINE | Facility: CLINIC | Age: 32
End: 2022-05-18
Payer: COMMERCIAL

## 2022-05-18 DIAGNOSIS — J03.90 TONSILLITIS: Primary | ICD-10-CM

## 2022-05-18 LAB — SARS-COV-2 RNA RESP QL NAA+PROBE: NEGATIVE

## 2022-05-18 ASSESSMENT — ANXIETY QUESTIONNAIRES: GAD7 TOTAL SCORE: 2

## 2022-05-18 ASSESSMENT — PATIENT HEALTH QUESTIONNAIRE - PHQ9: SUM OF ALL RESPONSES TO PHQ QUESTIONS 1-9: 5

## 2022-05-18 NOTE — LETTER
May 24, 2022      Ambrosio Kemp  [address] [place] [address]        To Whom It May Concern:    Ambrosio Kmep was seen on 5-17-22.  Please excuse him until 5-26-22 due to infectious illness.        Sincerely,        JAC Javed CNP

## 2022-05-19 ENCOUNTER — TELEPHONE (OUTPATIENT)
Dept: FAMILY MEDICINE | Facility: CLINIC | Age: 32
End: 2022-05-19
Payer: COMMERCIAL

## 2022-05-20 ENCOUNTER — TELEPHONE (OUTPATIENT)
Dept: FAMILY MEDICINE | Facility: CLINIC | Age: 32
End: 2022-05-20
Payer: COMMERCIAL

## 2022-05-20 NOTE — TELEPHONE ENCOUNTER
MTM referral from: [facility] visit (referral by provider)    MTM referral outreach attempt #2 on May 20, 2022 at 1:03 PM      Outcome: Patient not reachable after several attempts, will route to MT Pharmacist/Provider as an FYI.  [facility] scheduling number is [phone].  Thank you for the referral.    Annamaria Waller [facility]

## 2022-05-24 ENCOUNTER — VIRTUAL VISIT (OUTPATIENT)
Dept: PHARMACY | Facility: CLINIC | Age: 32
End: 2022-05-24
Payer: COMMERCIAL

## 2022-05-24 ENCOUNTER — TRANSFERRED RECORDS (OUTPATIENT)
Dept: HEALTH INFORMATION MANAGEMENT | Facility: CLINIC | Age: 32
End: 2022-05-24
Payer: COMMERCIAL

## 2022-05-24 DIAGNOSIS — F41.1 GAD (GENERALIZED ANXIETY DISORDER): ICD-10-CM

## 2022-05-24 DIAGNOSIS — F31.81 BIPOLAR 2 DISORDER (H): Primary | ICD-10-CM

## 2022-05-24 PROCEDURE — 99607 MTMS BY PHARM ADDL 15 MIN: CPT | Performed by: PHARMACIST

## 2022-05-24 PROCEDURE — 99605 MTMS BY PHARM NP 15 MIN: CPT | Performed by: PHARMACIST

## 2022-05-24 RX ORDER — HYDROXYZINE HYDROCHLORIDE 25 MG/1
25-50 TABLET, FILM COATED ORAL
Qty: 30 TABLET | Refills: 1 | Status: SHIPPED | OUTPATIENT
Start: 2022-05-24 | End: 2023-06-19

## 2022-05-24 RX ORDER — LAMOTRIGINE 100 MG/1
50 TABLET ORAL DAILY
Qty: 15 TABLET | Refills: 2 | Status: SHIPPED | OUTPATIENT
Start: 2022-05-24 | End: 2022-10-13

## 2022-05-24 NOTE — TELEPHONE ENCOUNTER
Can you please call patient?  I do not want these items lost to follow-up.  Status of sore throat, testing that has been done and what is still due and status of work letter needs (if needs, then he should tell us what to include)  Thanks!  JERSEY Izquierdo

## 2022-05-24 NOTE — TELEPHONE ENCOUNTER
1) Going to do the strep swab [facility] today 11:40. Is not sure if they can do the GC/chlamidya but is going to ask them. Pt will call or mychart us with these results.    Soonest he could come in here for swabs is on the 26th if they can't do GC  covid and strep-like symptoms.      2) Letter t'd up with requirements per patient- he can print this from [employer] or will let us know if he needs it faxed to work at all.    Annamaria JACKSON RN

## 2022-05-24 NOTE — PROGRESS NOTES
Medication Therapy Management (MTM) Encounter    ASSESSMENT:                            Medication Adherence/Access: No issues identified    Bipolar II: patient would benefit from trial of higher dose of lamotrigine, will discuss with PCP.  Encouraged reduction in alcohol and marijuana use.  Unnecessary at this time to use antipsychotic for sleep; hypomania is infrequent and mild. Seroquel at low dose 25mg could be a future option for sleep if needed but prefer to avoid due to side effects.  OK to trial hydroxyzine as needed for help falling asleep. Encouraged infrequent use.    PLAN:                            1. Increase lamotrigine to 250mg daily - verbal OK per PCP  2. Start hydroxyzine 25-50mg at bedtime as needed    Follow-up: 2 months    SUBJECTIVE/OBJECTIVE:                          Ambrosio Kemp is a 31 year old male called for an initial visit. He was referred to me from Rin Mena.      Reason for visit: review bipolar medications.    Allergies/ADRs: Reviewed in chart  Past Medical History: Reviewed in chart  Tobacco: He reports that he has never smoked. He has never used smokeless tobacco.  Alcohol: 5-6 drinks 2-3 times a week socially  Other Substance Use: marijuana - for sleep, not every night    Medication Adherence/Access: Patient takes medications directly from bottles.  Patient takes medications 1 time(s) per day.   Per patient, misses medication 1 times per 3-4 weeks.     Bipolar II: Currently taking lamotrigine 200mg daily. Has been taking for about 5 years and works well. No reported side effects.  Every 2-3 months notices a change in mood. Has a week of depression, not wanting to do anything or get our of bed. Significant for 1-2 days. Occasionally will need to call off work for depression, rarely now with lamotrigine.  Overall lamotrigine has been helpful.    Sleep: about once a month noticing trouble falling asleep and staying asleep.  At worst, 3-4 hours of sleep. Feels a little more  energetic at those times as well. No risky behavior.  More frequently has nights when he doesn't want to go to sleep and will stay up until 4am. He does work evening shift.  Anxiety: mild, seems normal.  Lamotrigine also helpful for anxiety. Hasn't taken lorazepam in a long time. Uses for sleep occasionally, finds it helpful.  Medication history: Lexapro (richard, suicidal thoughts)    Today's Vitals: There were no vitals taken for this visit.  ----------------    I spent 30 minutes with this patient today. All changes were made via collaborative practice agreement with JAC Javed CNP. A copy of the visit note was provided to the patient's provider(s).    The patient was sent via [employer] a summary of these recommendations.     Isabelle Isaac, PharmD, BCACP     Telemedicine Visit Details  Type of service:  Telephone visit  Start Time: 10:33 AM  End Time: 10:59 AM  Originating Location (patient location): Home  Distant Location (provider location):  [facility]     Medication Therapy Recommendations  Bipolar 2 disorder (H)    Current Medication: hydrOXYzine (ATARAX) 25 MG tablet   Rationale: Synergistic therapy - Needs additional medication therapy - Indication   Recommendation: Start Medication   Status: Accepted per CPA          Current Medication: lamoTRIgine (LAMICTAL) 200 MG tablet   Rationale: Dose too low - Dosage too low - Effectiveness   Recommendation: Increase Dose   Status: Accepted per Provider

## 2022-05-24 NOTE — PATIENT INSTRUCTIONS
"Recommendations from today's MTM visit:                                                    MTM (medication therapy management) is a service provided by a clinical pharmacist designed to help you get the most of out of your medicines.   Today we reviewed what your medicines are for, how to know if they are working, that your medicines are safe and how to make your medicine regimen as easy as possible.      I talked to Venessa and she agrees with the increase in lamotrigine.  Please cut a 100mg tablet in half for 50mg and take total of 250mg lamotrigine once a day.  Start hydroxyzine 1-2 tablets at bedtime as needed - try not to take every night, hoping this will help on more challenging nights for you to feel more drowsy and fall asleep easier.  Would prefer you try this in place of marijuana to help with your sleep and depression.     Follow-up: 2 months - please let me know a good time to schedule this visit, phone/video is OK    It was great speaking with you today.  I value your experience and would be very thankful for your time in providing feedback in our clinic survey. In the next few days, you may receive an email or text message from [employer] with a link to a survey related to your  clinical pharmacist.\"     To schedule another MTM appointment, please call the clinic directly or you may call the MTM scheduling line at [phone] or toll-free at [phone].     My Clinical Pharmacist's contact information:                                                      Please feel free to contact me with any questions or concerns you have.      Isabelle Isaac, PharmD, BCACP   Medication Management Pharmacist   [facility]  [phone]    "

## 2022-05-25 NOTE — TELEPHONE ENCOUNTER
Called again and left message.  Also sent Mychart message.    Gin Robison RN    
Called patient again and left message to call back RN line to see if swabs were collected at [facility] or if they still need to be done.    Radha Velasco, RN    
Left message for pt to call when he gets message.        Venessa called triage-    Negative covid test    Has swollen tonsils so Venessa would like pt to have GC/Ch swab of throat and a strep test today.  Can do at [facility] or here or split up (not sure [facility] does strep tests)- which ever pt prefers.    She wants this done today.    If any positive will treat. If all negative will send to ENT for chronic tonsillitis.         Gin Robison RN    
See Mychart encounter 5/18.  Gin Robison RN    
None

## 2022-07-11 ENCOUNTER — MYC REFILL (OUTPATIENT)
Dept: FAMILY MEDICINE | Facility: CLINIC | Age: 32
End: 2022-07-11

## 2022-07-11 DIAGNOSIS — F31.81 BIPOLAR 2 DISORDER (H): ICD-10-CM

## 2022-07-11 DIAGNOSIS — F41.1 GAD (GENERALIZED ANXIETY DISORDER): ICD-10-CM

## 2022-07-11 DIAGNOSIS — F33.1 MODERATE EPISODE OF RECURRENT MAJOR DEPRESSIVE DISORDER (H): ICD-10-CM

## 2022-07-12 RX ORDER — LAMOTRIGINE 200 MG/1
200 TABLET ORAL DAILY
Qty: 90 TABLET | Refills: 0 | Status: SHIPPED | OUTPATIENT
Start: 2022-07-12 | End: 2022-10-26

## 2022-07-12 NOTE — TELEPHONE ENCOUNTER
"Requested Prescriptions   Pending Prescriptions Disp Refills     lamoTRIgine (LAMICTAL) 200 MG tablet 90 tablet 0     Sig: Take 1 tablet (200 mg) by mouth daily       Anti-Seizure Meds Protocol  Failed - 7/11/2022 11:37 PM        Failed - Review Authorizing provider's last note.      Refer to last progress notes: confirm request is for original authorizing provider (cannot be through other providers).          Failed - Normal CBC on file in past 26 months     Recent Labs   Lab Test 05/02/18  1057   WBC 5.8   RBC 5.15   HGB 16.9   HCT 49.5                    Failed - Normal platelet count on file in past 26 months     Recent Labs   Lab Test 05/02/18  1057                  Passed - Recent (12 mo) or future (30 days) visit within the authorizing provider's specialty     Patient has had an office visit with the authorizing provider or a provider within the authorizing providers department within the previous 12 mos or has a future within next 30 days. See \"Patient Info\" tab in inbasket, or \"Choose Columns\" in Meds & Orders section of the refill encounter.              Passed - Normal ALT or AST on file in past 26 months     Recent Labs   Lab Test 08/03/20  1514   ALT 38     Recent Labs   Lab Test 08/03/20  1514   AST 44             Passed - Medication is active on med list           Unable to refill per protocol.  Annamaria JACKSON RN    "

## 2022-09-10 ENCOUNTER — HEALTH MAINTENANCE LETTER (OUTPATIENT)
Age: 32
End: 2022-09-10

## 2022-10-26 ENCOUNTER — MYC REFILL (OUTPATIENT)
Dept: FAMILY MEDICINE | Facility: CLINIC | Age: 32
End: 2022-10-26

## 2022-10-26 DIAGNOSIS — F31.81 BIPOLAR 2 DISORDER (H): ICD-10-CM

## 2022-10-26 DIAGNOSIS — F33.1 MODERATE EPISODE OF RECURRENT MAJOR DEPRESSIVE DISORDER (H): ICD-10-CM

## 2022-10-26 DIAGNOSIS — F41.1 GAD (GENERALIZED ANXIETY DISORDER): ICD-10-CM

## 2022-10-27 RX ORDER — LAMOTRIGINE 200 MG/1
200 TABLET ORAL DAILY
Qty: 90 TABLET | Refills: 0 | Status: SHIPPED | OUTPATIENT
Start: 2022-10-27 | End: 2023-01-23

## 2022-10-27 NOTE — TELEPHONE ENCOUNTER
"Requested Prescriptions   Pending Prescriptions Disp Refills     lamoTRIgine (LAMICTAL) 200 MG tablet 90 tablet 0     Sig: Take 1 tablet (200 mg) by mouth daily       Anti-Seizure Meds Protocol  Failed - 10/26/2022  9:50 PM        Failed - Review Authorizing provider's last note.      Refer to last progress notes: confirm request is for original authorizing provider (cannot be through other providers).          Failed - Normal CBC on file in past 26 months     Recent Labs   Lab Test 05/02/18  1057   WBC 5.8   RBC 5.15   HGB 16.9   HCT 49.5                    Failed - Normal ALT or AST on file in past 26 months     Recent Labs   Lab Test 08/03/20  1514   ALT 38     Recent Labs   Lab Test 08/03/20  1514   AST 44             Failed - Normal platelet count on file in past 26 months     Recent Labs   Lab Test 05/02/18  1057                  Passed - Recent (12 mo) or future (30 days) visit within the authorizing provider's specialty     Patient has had an office visit with the authorizing provider or a provider within the authorizing providers department within the previous 12 mos or has a future within next 30 days. See \"Patient Info\" tab in inbasket, or \"Choose Columns\" in Meds & Orders section of the refill encounter.              Passed - Medication is active on med list             Routing refill request to provider for review/approval because medication did not pass protocol.    Pt had an appointment on 05/17/22    Chrissie Elder RN  [facility]          "

## 2023-01-23 DIAGNOSIS — F33.1 MODERATE EPISODE OF RECURRENT MAJOR DEPRESSIVE DISORDER (H): ICD-10-CM

## 2023-01-23 DIAGNOSIS — F41.1 GAD (GENERALIZED ANXIETY DISORDER): ICD-10-CM

## 2023-01-23 DIAGNOSIS — F31.81 BIPOLAR 2 DISORDER (H): ICD-10-CM

## 2023-01-23 RX ORDER — LAMOTRIGINE 200 MG/1
200 TABLET ORAL DAILY
Qty: 90 TABLET | Refills: 0 | Status: SHIPPED | OUTPATIENT
Start: 2023-01-23 | End: 2023-02-22

## 2023-01-23 NOTE — TELEPHONE ENCOUNTER
"Requested Prescriptions   Pending Prescriptions Disp Refills     lamoTRIgine (LAMICTAL) 200 MG tablet 90 tablet 0     Sig: Take 1 tablet (200 mg) by mouth daily       Anti-Seizure Meds Protocol  Failed - 1/23/2023  8:09 AM        Failed - Review Authorizing provider's last note.      Refer to last progress notes: confirm request is for original authorizing provider (cannot be through other providers).          Failed - Normal CBC on file in past 26 months     Recent Labs   Lab Test 05/02/18  1057   WBC 5.8   RBC 5.15   HGB 16.9   HCT 49.5                    Failed - Normal ALT or AST on file in past 26 months     Recent Labs   Lab Test 08/03/20  1514   ALT 38     Recent Labs   Lab Test 08/03/20  1514   AST 44             Failed - Normal platelet count on file in past 26 months     Recent Labs   Lab Test 05/02/18  1057                  Passed - Recent (12 mo) or future (30 days) visit within the authorizing provider's specialty     Patient has had an office visit with the authorizing provider or a provider within the authorizing providers department within the previous 12 mos or has a future within next 30 days. See \"Patient Info\" tab in inbasket, or \"Choose Columns\" in Meds & Orders section of the refill encounter.              Passed - Medication is active on med list           Routing refill request to provider for review/approval because medication did not pass protocol.    Pt was seen on 05/17/22    Chrissie Elder RN  [facility]   "

## 2023-02-20 ENCOUNTER — MYC REFILL (OUTPATIENT)
Dept: FAMILY MEDICINE | Facility: CLINIC | Age: 33
End: 2023-02-20
Payer: COMMERCIAL

## 2023-02-20 DIAGNOSIS — F33.1 MODERATE EPISODE OF RECURRENT MAJOR DEPRESSIVE DISORDER (H): ICD-10-CM

## 2023-02-20 DIAGNOSIS — F31.81 BIPOLAR 2 DISORDER (H): ICD-10-CM

## 2023-02-20 DIAGNOSIS — F41.1 GAD (GENERALIZED ANXIETY DISORDER): ICD-10-CM

## 2023-02-21 RX ORDER — LAMOTRIGINE 200 MG/1
200 TABLET ORAL DAILY
Qty: 90 TABLET | Refills: 0 | OUTPATIENT
Start: 2023-02-21

## 2023-02-21 NOTE — TELEPHONE ENCOUNTER
"Requested Prescriptions   Pending Prescriptions Disp Refills     lamoTRIgine (LAMICTAL) 200 MG tablet 90 tablet 0     Sig: Take 1 tablet (200 mg) by mouth daily       Anti-Seizure Meds Protocol  Failed - 2/20/2023  9:07 AM        Failed - Review Authorizing provider's last note.      Refer to last progress notes: confirm request is for original authorizing provider (cannot be through other providers).          Failed - Normal CBC on file in past 26 months     Recent Labs   Lab Test 05/02/18  1057   WBC 5.8   RBC 5.15   HGB 16.9   HCT 49.5                    Failed - Normal ALT or AST on file in past 26 months     Recent Labs   Lab Test 08/03/20  1514   ALT 38     Recent Labs   Lab Test 08/03/20  1514   AST 44             Failed - Normal platelet count on file in past 26 months     Recent Labs   Lab Test 05/02/18  1057                  Passed - Recent (12 mo) or future (30 days) visit within the authorizing provider's specialty     Patient has had an office visit with the authorizing provider or a provider within the authorizing providers department within the previous 12 mos or has a future within next 30 days. See \"Patient Info\" tab in inbasket, or \"Choose Columns\" in Meds & Orders section of the refill encounter.              Passed - Medication is active on med list         Refilled on 1/23/23 with 3 month suppply, pt should still have medication until closer to mid April.    Hugh Oleary RN   [facility]      "

## 2023-02-22 ENCOUNTER — MYC REFILL (OUTPATIENT)
Dept: FAMILY MEDICINE | Facility: CLINIC | Age: 33
End: 2023-02-22
Payer: COMMERCIAL

## 2023-02-22 DIAGNOSIS — F31.81 BIPOLAR 2 DISORDER (H): ICD-10-CM

## 2023-02-22 DIAGNOSIS — F33.1 MODERATE EPISODE OF RECURRENT MAJOR DEPRESSIVE DISORDER (H): ICD-10-CM

## 2023-02-22 DIAGNOSIS — F41.1 GAD (GENERALIZED ANXIETY DISORDER): ICD-10-CM

## 2023-02-22 NOTE — TELEPHONE ENCOUNTER
"Requested Prescriptions   Pending Prescriptions Disp Refills     lamoTRIgine (LAMICTAL) 200 MG tablet 90 tablet 0     Sig: Take 1 tablet (200 mg) by mouth daily       Anti-Seizure Meds Protocol  Failed - 2/22/2023  4:57 PM        Failed - Review Authorizing provider's last note.      Refer to last progress notes: confirm request is for original authorizing provider (cannot be through other providers).          Failed - Normal CBC on file in past 26 months     Recent Labs   Lab Test 05/02/18  1057   WBC 5.8   RBC 5.15   HGB 16.9   HCT 49.5                    Failed - Normal ALT or AST on file in past 26 months     Recent Labs   Lab Test 08/03/20  1514   ALT 38     Recent Labs   Lab Test 08/03/20  1514   AST 44             Failed - Normal platelet count on file in past 26 months     Recent Labs   Lab Test 05/02/18  1057                  Passed - Recent (12 mo) or future (30 days) visit within the authorizing provider's specialty     Patient has had an office visit with the authorizing provider or a provider within the authorizing providers department within the previous 12 mos or has a future within next 30 days. See \"Patient Info\" tab in inbasket, or \"Choose Columns\" in Meds & Orders section of the refill encounter.              Passed - Medication is active on med list         Routing refill request to provider for review/approval because medication did not pass protocol.    Pt was seen on 05/22    Chrissie Elder RN  [facility]   "

## 2023-02-23 RX ORDER — LAMOTRIGINE 200 MG/1
200 TABLET ORAL DAILY
Qty: 90 TABLET | Refills: 0 | Status: SHIPPED | OUTPATIENT
Start: 2023-02-23 | End: 2023-05-31

## 2023-02-23 NOTE — TELEPHONE ENCOUNTER
Please schedule for physical plus mental health for soonest available.  Will refill thru appt.  K. JERSEY Hamilton

## 2023-04-20 ENCOUNTER — PATIENT OUTREACH (OUTPATIENT)
Dept: CARE COORDINATION | Facility: CLINIC | Age: 33
End: 2023-04-20
Payer: COMMERCIAL

## 2023-05-04 ENCOUNTER — PATIENT OUTREACH (OUTPATIENT)
Dept: CARE COORDINATION | Facility: CLINIC | Age: 33
End: 2023-05-04
Payer: COMMERCIAL

## 2023-06-05 ENCOUNTER — MYC REFILL (OUTPATIENT)
Dept: FAMILY MEDICINE | Facility: CLINIC | Age: 33
End: 2023-06-05
Payer: COMMERCIAL

## 2023-06-05 DIAGNOSIS — F41.1 GAD (GENERALIZED ANXIETY DISORDER): ICD-10-CM

## 2023-06-05 DIAGNOSIS — F31.81 BIPOLAR 2 DISORDER (H): ICD-10-CM

## 2023-06-05 DIAGNOSIS — F33.1 MODERATE EPISODE OF RECURRENT MAJOR DEPRESSIVE DISORDER (H): ICD-10-CM

## 2023-06-05 RX ORDER — LAMOTRIGINE 200 MG/1
200 TABLET ORAL DAILY
Qty: 30 TABLET | Refills: 0 | OUTPATIENT
Start: 2023-06-05

## 2023-06-05 NOTE — TELEPHONE ENCOUNTER
Refilled 5 days ago, sent pt mychart relaying refill.    Hugh Oleary RN   [facility]

## 2023-06-19 ENCOUNTER — VIRTUAL VISIT (OUTPATIENT)
Dept: FAMILY MEDICINE | Facility: CLINIC | Age: 33
End: 2023-06-19
Payer: COMMERCIAL

## 2023-06-19 DIAGNOSIS — F33.1 MODERATE EPISODE OF RECURRENT MAJOR DEPRESSIVE DISORDER (H): ICD-10-CM

## 2023-06-19 DIAGNOSIS — F31.81 BIPOLAR 2 DISORDER (H): Primary | ICD-10-CM

## 2023-06-19 DIAGNOSIS — F41.1 GAD (GENERALIZED ANXIETY DISORDER): ICD-10-CM

## 2023-06-19 DIAGNOSIS — R03.0 ELEVATED BLOOD PRESSURE READING WITHOUT DIAGNOSIS OF HYPERTENSION: ICD-10-CM

## 2023-06-19 PROCEDURE — 99215 OFFICE O/P EST HI 40 MIN: CPT | Mod: 95 | Performed by: NURSE PRACTITIONER

## 2023-06-19 RX ORDER — LAMOTRIGINE 200 MG/1
200 TABLET ORAL DAILY
Qty: 90 TABLET | Refills: 1 | Status: SHIPPED | OUTPATIENT
Start: 2023-06-19 | End: 2023-10-29

## 2023-06-19 RX ORDER — LORAZEPAM 1 MG/1
1 TABLET ORAL DAILY PRN
Qty: 10 TABLET | Refills: 0 | Status: SHIPPED | OUTPATIENT
Start: 2023-06-19

## 2023-06-19 RX ORDER — HYDROXYZINE HYDROCHLORIDE 25 MG/1
25-50 TABLET, FILM COATED ORAL
Qty: 30 TABLET | Refills: 1 | Status: SHIPPED | OUTPATIENT
Start: 2023-06-19 | End: 2023-10-29

## 2023-06-19 NOTE — PATIENT INSTRUCTIONS
Work on sleep   - return to the sleep hygiene that you've done previously   - gentler backing up on the consistent wake-up time   - hydroxyzine assist on the falling asleep (can try 1/2 dose of 12.5 mg). Also, try to take this earlier in evening and not after you haven' been able to fall asleep   - less to no alcohol for awhile to help with quality of sleep    I suspect that these will help with getting sleep onto a routine and help even out moods further  However, I would feel confident adding the quetiapine (Seroqual) 25-50 mg nightly if these aren't working - it should help with sleep and with a bit more mood stability, as well, and the lower end of the doses don't has as much effect on weight
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
## 2023-07-07 ENCOUNTER — TELEPHONE (OUTPATIENT)
Dept: FAMILY MEDICINE | Facility: CLINIC | Age: 33
End: 2023-07-07

## 2023-07-07 DIAGNOSIS — F41.1 GAD (GENERALIZED ANXIETY DISORDER): ICD-10-CM

## 2023-07-07 RX ORDER — HYDROXYZINE HYDROCHLORIDE 25 MG/1
25-50 TABLET, FILM COATED ORAL
Qty: 30 TABLET | Refills: 1 | Status: CANCELLED | OUTPATIENT
Start: 2023-07-07

## 2023-07-23 ENCOUNTER — HEALTH MAINTENANCE LETTER (OUTPATIENT)
Age: 33
End: 2023-07-23

## 2023-10-29 ENCOUNTER — MYC REFILL (OUTPATIENT)
Dept: FAMILY MEDICINE | Facility: CLINIC | Age: 33
End: 2023-10-29
Payer: COMMERCIAL

## 2023-10-29 DIAGNOSIS — F41.1 GAD (GENERALIZED ANXIETY DISORDER): ICD-10-CM

## 2023-10-29 DIAGNOSIS — F31.81 BIPOLAR 2 DISORDER (H): ICD-10-CM

## 2023-10-29 DIAGNOSIS — F33.1 MODERATE EPISODE OF RECURRENT MAJOR DEPRESSIVE DISORDER (H): ICD-10-CM

## 2023-10-30 RX ORDER — LAMOTRIGINE 200 MG/1
200 TABLET ORAL DAILY
Qty: 30 TABLET | Refills: 0 | Status: SHIPPED | OUTPATIENT
Start: 2023-10-30 | End: 2023-11-27

## 2023-10-30 RX ORDER — HYDROXYZINE HYDROCHLORIDE 25 MG/1
25-50 TABLET, FILM COATED ORAL
Qty: 30 TABLET | Refills: 0 | Status: SHIPPED | OUTPATIENT
Start: 2023-10-30 | End: 2024-01-31

## 2023-11-27 DIAGNOSIS — F41.1 GAD (GENERALIZED ANXIETY DISORDER): ICD-10-CM

## 2023-11-27 DIAGNOSIS — F31.81 BIPOLAR 2 DISORDER (H): ICD-10-CM

## 2023-11-27 DIAGNOSIS — F33.1 MODERATE EPISODE OF RECURRENT MAJOR DEPRESSIVE DISORDER (H): ICD-10-CM

## 2023-11-27 RX ORDER — LAMOTRIGINE 200 MG/1
200 TABLET ORAL DAILY
Qty: 30 TABLET | Refills: 0 | Status: SHIPPED | OUTPATIENT
Start: 2023-11-27 | End: 2023-12-29

## 2023-11-27 NOTE — TELEPHONE ENCOUNTER
Per note below, I called patient to schedule  appointment. Left voicemail for patient to call back and schedule.  Martine Arias

## 2023-11-27 NOTE — TELEPHONE ENCOUNTER
Was to have preventative and mental health recheck in October. Will fill one month. Needs to schedule.  JERSEY Izquierdo

## 2023-11-27 NOTE — TELEPHONE ENCOUNTER
Pending Prescriptions:                       Disp   Refills    lamoTRIgine (LAMICTAL) 200 MG tablet      30 tab*0            Sig: Take 1 tablet (200 mg) by mouth daily Due for           appt for refills

## 2023-12-29 DIAGNOSIS — F41.1 GAD (GENERALIZED ANXIETY DISORDER): ICD-10-CM

## 2023-12-29 DIAGNOSIS — F33.1 MODERATE EPISODE OF RECURRENT MAJOR DEPRESSIVE DISORDER (H): ICD-10-CM

## 2023-12-29 DIAGNOSIS — F31.81 BIPOLAR 2 DISORDER (H): ICD-10-CM

## 2023-12-29 RX ORDER — LAMOTRIGINE 200 MG/1
200 TABLET ORAL DAILY
Qty: 15 TABLET | Refills: 0 | Status: SHIPPED | OUTPATIENT
Start: 2023-12-29 | End: 2024-01-31

## 2024-01-31 ENCOUNTER — MYC REFILL (OUTPATIENT)
Dept: FAMILY MEDICINE | Facility: CLINIC | Age: 34
End: 2024-01-31
Payer: COMMERCIAL

## 2024-01-31 DIAGNOSIS — F41.1 GAD (GENERALIZED ANXIETY DISORDER): ICD-10-CM

## 2024-01-31 DIAGNOSIS — F31.81 BIPOLAR 2 DISORDER (H): ICD-10-CM

## 2024-01-31 DIAGNOSIS — F33.1 MODERATE EPISODE OF RECURRENT MAJOR DEPRESSIVE DISORDER (H): ICD-10-CM

## 2024-01-31 RX ORDER — LAMOTRIGINE 200 MG/1
200 TABLET ORAL DAILY
Qty: 15 TABLET | Refills: 0 | Status: SHIPPED | OUTPATIENT
Start: 2024-01-31 | End: 2024-02-12

## 2024-01-31 RX ORDER — HYDROXYZINE HYDROCHLORIDE 25 MG/1
25-50 TABLET, FILM COATED ORAL
Qty: 30 TABLET | Refills: 0 | Status: SHIPPED | OUTPATIENT
Start: 2024-01-31 | End: 2024-02-21

## 2024-01-31 NOTE — TELEPHONE ENCOUNTER
Prescription approved per [facility] Refill Protocol.  Cassidy Farris, RN  [facility] Triage Nurse

## 2024-02-12 ENCOUNTER — VIRTUAL VISIT (OUTPATIENT)
Dept: FAMILY MEDICINE | Facility: CLINIC | Age: 34
End: 2024-02-12
Payer: MEDICAID

## 2024-02-12 DIAGNOSIS — F31.81 BIPOLAR 2 DISORDER (H): ICD-10-CM

## 2024-02-12 DIAGNOSIS — F33.1 MODERATE EPISODE OF RECURRENT MAJOR DEPRESSIVE DISORDER (H): ICD-10-CM

## 2024-02-12 DIAGNOSIS — F41.1 GAD (GENERALIZED ANXIETY DISORDER): ICD-10-CM

## 2024-02-12 PROCEDURE — 99214 OFFICE O/P EST MOD 30 MIN: CPT | Mod: 95 | Performed by: NURSE PRACTITIONER

## 2024-02-12 RX ORDER — LAMOTRIGINE 200 MG/1
200 TABLET ORAL DAILY
Qty: 90 TABLET | Refills: 1 | Status: SHIPPED | OUTPATIENT
Start: 2024-02-12 | End: 2024-09-11

## 2024-02-12 RX ORDER — HYDROXYZINE PAMOATE 50 MG/1
50 CAPSULE ORAL 3 TIMES DAILY PRN
Qty: 30 CAPSULE | Refills: 0 | Status: SHIPPED | OUTPATIENT
Start: 2024-02-12

## 2024-02-12 NOTE — PROGRESS NOTES
Ambrosio is a 33 year old who is being evaluated via a billable video visit.      How would you like to obtain your AVS? MyChart  If the video visit is dropped, the invitation should be resent by: Text to cell phone: [phone]  Will anyone else be joining your video visit? No      Assessment & Plan     Bipolar 2 disorder (H)  Stable - continue lamotrigine 200 mg daily  - lamoTRIgine (LAMICTAL) 200 MG tablet; Take 1 tablet (200 mg) by mouth daily    TODD (generalized anxiety disorder)  - hydrOXYzine (VISTARIL) 50 MG capsule; Take 1 capsule (50 mg) by mouth 3 times daily as needed for itching  - lamoTRIgine (LAMICTAL) 200 MG tablet; Take 1 tablet (200 mg) by mouth daily    Moderate episode of recurrent major depressive disorder (H)  - lamoTRIgine (LAMICTAL) 200 MG tablet; Take 1 tablet (200 mg) by mouth daily    Prescription drug management        Subjective   Ambrosio is a 33 year old, presenting for the following health issues:   Follow Up    HPI     Has not had gap in medication. Taking lamotrigine 200 mg. Mood has been stable and positive. Not having a lot of depressive episodes. No manic episodes. No anxiety. No SI. Not seeing therapist - not looking forward to finding a therapist and also fitting into his schedule.     Sleep has been getting better. Hydroxyzine has been helping - takes 50 mg every 2-3 nights. 25 mg didn't help much.  No medication side effects.     Got strep in November        Objective           Vitals:  No vitals were obtained today due to virtual visit.    Physical Exam   GENERAL: alert and no distress  EYES: Eyes grossly normal to inspection.  No discharge or erythema, or obvious scleral/conjunctival abnormalities.  RESP: No audible wheeze, cough, or visible cyanosis.    SKIN: Visible skin clear. No significant rash, abnormal pigmentation or lesions.  NEURO: Cranial nerves grossly intact.  Mentation and speech appropriate for age.  PSYCH: Appropriate affect, tone, and pace of words         Video-Visit Details    Type of service:  Video Visit   Originating Location (pt. Location): Home  Distant Location (provider location):  On-site  Platform used for Video Visit: Kaylee  Signed Electronically by: JAC Javed CNP

## 2024-02-21 ENCOUNTER — OFFICE VISIT (OUTPATIENT)
Dept: FAMILY MEDICINE | Facility: CLINIC | Age: 34
End: 2024-02-21
Payer: MEDICAID

## 2024-02-21 VITALS
HEIGHT: 70 IN | OXYGEN SATURATION: 98 % | TEMPERATURE: 97.8 F | SYSTOLIC BLOOD PRESSURE: 128 MMHG | RESPIRATION RATE: 22 BRPM | WEIGHT: 234 LBS | DIASTOLIC BLOOD PRESSURE: 82 MMHG | HEART RATE: 122 BPM | BODY MASS INDEX: 33.5 KG/M2

## 2024-02-21 DIAGNOSIS — Z00.00 ROUTINE GENERAL MEDICAL EXAMINATION AT A HEALTH CARE FACILITY: Primary | ICD-10-CM

## 2024-02-21 DIAGNOSIS — Z11.59 NEED FOR HEPATITIS C SCREENING TEST: ICD-10-CM

## 2024-02-21 DIAGNOSIS — J02.0 RECURRENT STREPTOCOCCAL PHARYNGITIS: ICD-10-CM

## 2024-02-21 DIAGNOSIS — Z23 NEED FOR DIPHTHERIA-TETANUS-PERTUSSIS (TDAP) VACCINE: ICD-10-CM

## 2024-02-21 DIAGNOSIS — Z23 NEED FOR HEPATITIS A AND B VACCINATION: ICD-10-CM

## 2024-02-21 DIAGNOSIS — Z23 NEED FOR COVID-19 VACCINE: ICD-10-CM

## 2024-02-21 DIAGNOSIS — Z23 NEEDS FLU SHOT: ICD-10-CM

## 2024-02-21 DIAGNOSIS — R03.0 ELEVATED BLOOD PRESSURE READING WITHOUT DIAGNOSIS OF HYPERTENSION: ICD-10-CM

## 2024-02-21 LAB — HOLD SPECIMEN: NORMAL

## 2024-02-21 PROCEDURE — 90471 IMMUNIZATION ADMIN: CPT | Performed by: NURSE PRACTITIONER

## 2024-02-21 PROCEDURE — 90636 HEP A/HEP B VACC ADULT IM: CPT | Performed by: NURSE PRACTITIONER

## 2024-02-21 PROCEDURE — 82570 ASSAY OF URINE CREATININE: CPT | Performed by: NURSE PRACTITIONER

## 2024-02-21 PROCEDURE — 91320 SARSCV2 VAC 30MCG TRS-SUC IM: CPT | Performed by: NURSE PRACTITIONER

## 2024-02-21 PROCEDURE — 82043 UR ALBUMIN QUANTITATIVE: CPT | Performed by: NURSE PRACTITIONER

## 2024-02-21 PROCEDURE — 90686 IIV4 VACC NO PRSV 0.5 ML IM: CPT | Performed by: NURSE PRACTITIONER

## 2024-02-21 PROCEDURE — 80053 COMPREHEN METABOLIC PANEL: CPT | Performed by: NURSE PRACTITIONER

## 2024-02-21 PROCEDURE — 90472 IMMUNIZATION ADMIN EACH ADD: CPT | Performed by: NURSE PRACTITIONER

## 2024-02-21 PROCEDURE — 90480 ADMN SARSCOV2 VAC 1/ONLY CMP: CPT | Performed by: NURSE PRACTITIONER

## 2024-02-21 PROCEDURE — 90715 TDAP VACCINE 7 YRS/> IM: CPT | Performed by: NURSE PRACTITIONER

## 2024-02-21 PROCEDURE — 36415 COLL VENOUS BLD VENIPUNCTURE: CPT | Performed by: NURSE PRACTITIONER

## 2024-02-21 PROCEDURE — 86803 HEPATITIS C AB TEST: CPT | Performed by: NURSE PRACTITIONER

## 2024-02-21 PROCEDURE — 99395 PREV VISIT EST AGE 18-39: CPT | Mod: 25 | Performed by: NURSE PRACTITIONER

## 2024-02-21 SDOH — HEALTH STABILITY: PHYSICAL HEALTH: ON AVERAGE, HOW MANY DAYS PER WEEK DO YOU ENGAGE IN MODERATE TO STRENUOUS EXERCISE (LIKE A BRISK WALK)?: 4 DAYS

## 2024-02-21 ASSESSMENT — SOCIAL DETERMINANTS OF HEALTH (SDOH): HOW OFTEN DO YOU GET TOGETHER WITH FRIENDS OR RELATIVES?: TWICE A WEEK

## 2024-02-21 ASSESSMENT — PAIN SCALES - GENERAL: PAINLEVEL: NO PAIN (0)

## 2024-02-21 ASSESSMENT — PATIENT HEALTH QUESTIONNAIRE - PHQ9
10. IF YOU CHECKED OFF ANY PROBLEMS, HOW DIFFICULT HAVE THESE PROBLEMS MADE IT FOR YOU TO DO YOUR WORK, TAKE CARE OF THINGS AT HOME, OR GET ALONG WITH OTHER PEOPLE: SOMEWHAT DIFFICULT
SUM OF ALL RESPONSES TO PHQ QUESTIONS 1-9: 4
SUM OF ALL RESPONSES TO PHQ QUESTIONS 1-9: 4

## 2024-02-21 NOTE — PROGRESS NOTES
"Preventive Care Visit  [facility] PRIMARY CARE  JAC Javed CNP, Nurse Practitioner - Family  Feb 21, 2024    Assessment & Plan     Routine general medical examination at a health care facility  Reviewed     Recurrent streptococcal pharyngitis  Noted    Elevated blood pressure reading without diagnosis of hypertension  Somewhat elevated, though better today than in past. Renal function normal. Continue to monitor  BP Readings from Last 6 Encounters:   02/21/24 128/82   05/17/22 131/89   11/20/19 136/84   10/31/18 136/88   08/01/18 136/86   05/02/18 132/86     - Albumin Random Urine Quantitative with Creat Ratio  - Comprehensive metabolic panel (BMP + Alb, Alk Phos, ALT, AST, Total. Bili, TP)    Need for hepatitis C screening test  Low risk screening  - Hepatitis C Screen Reflex to HCV RNA Quant and Genotype    Need for hepatitis A and B vaccination  - HEP A & B (TWINRIX)    Need for diphtheria-tetanus-pertussis (Tdap) vaccine  - TDAP 7+ (ADACEL,BOOSTRIX)    Need for COVID-19 vaccine  - COVID-19 12+ (2023-24) (PFIZER)    Needs flu shot  - INFLUENZA VACCINE IM > 6 MONTHS VALENT IIV4 (AFLURIA/FLUZONE)    Patient has been advised of split billing requirements and indicates understanding: Yes  Ordering of each unique test  Prescription drug management      BMI  Estimated body mass index is 33.88 kg/m  as calculated from the following:    Height as of this encounter: 1.77 m (5' 9.69\").    Weight as of this encounter: 106.1 kg (234 lb).   Weight management plan: Discussed healthy diet and exercise guidelines    Counseling  Appropriate preventive services were discussed with this patient, including applicable screening as appropriate for fall prevention, nutrition, physical activity, Tobacco-use cessation, weight loss and cognition.  Checklist reviewing preventive services available has been given to the patient.      Patient Instructions     1.\"Eat food.  Not too much.  Mostly plants\" - " "Claude Pollen - see link below  - Aim for 5-7 servings of vegetables (raw vegetables - 1 serving = 1/2 cup; raw greens - 1 serving = 1 cup)   - Eat grains, but don't make them the superstar of your meal and DO make them whole grains  2. AVOID sugar.  There is no nutritional benefit to sugar.  3. Drink lots of water (avoid juice and soda)  4. MOVE your body daily - even if some days this means 5 minutes of movement. Walking is great for your bones and brain.  Do aim for 150 minutes per week of activity that raises your heart rate, but \"don't let the ideal get in the way of the good enough\"  5. Get good sleep (6-8 hours/night- less sleep is associated with disease/illness/obesity)   6. Smile and laugh every day - even in the face of tragedy  7. Start a meditation or mindfulness practice    CALCIUM: The average recommended intake for women is 5040-0754 mg calcium daily. It is best to get this from your diet (Milk, yogurt, and cheese, vegetables such as Chinese cabbage, kale, spinach and broccoli). If you are not eating enough calcium, then I recommend Calcium Citrate/vitD supplements daily.     Vitamin D is an essential nutritient that many [place] lack, especially in the winter. It is vital for healthy immune system functioning and can be linked to diseases such as obesity, diabetes, body aches/pain, etc. It is super safe and highly beneficial for a [place] to take a vitamin D3 2000 IU once daily during winter months. Daily vitamin D for life is recommended for anyone who has ever been found deficient.    Other things to consider: do not drive while under the influence of alcohol, do not drive while texting, always wear a seatbelt, wear a helmet when biking, wear sunscreen to help prevent skin cancer, use DEET mosquito spray when outdoors to prevent mosquito and tick bites, & avoid smoking. If you do get bit by a DEER tick, please contact my office immediately to consider lyme prophylaxis. Dental visits " "recommended every 6-12 months.    Claude Barrett's 7 Rules for Eating  [email or web address]/food-recipes/news/72420739/7-rules-for-eating#1    My typical clinic hours are as follows:  Monday 12-5 pm  Tuesday 8am-3pm  Wednesday 7:20am-1pm  Thursday virtual appts 8:20am-12:20 pm  Friday 8-11 am  If you need an appointment urgently and do not find one available on MessageMe or with Central scheduling, please send me a MessageMe message and I will work to get you scheduled with myself or a colleague here     Clinic notes  Lab and imaging results are now available for you to view immediately on completion.  Please know that I often have not seen the result before you see results.  I will interpret and discuss the results and meaning of the results as soon as I am able to review them.   MessageMe is the best way to reach the clinic directly.  When you send a MessageMe message this will be read by our clinic RNs.  Please know that when you call the clinic number, you are not speaking to a staff member from our local clinic.  You can ask that staff to speak to a clinic staff directly if you wish.  You will be able to read my documentation (\"provider notes\") when I finish up and close your chart.  I encourage you to read through to understand your diagnosis and the plan and how we arrived there.  It is also an opportunity to make sure I fully understood your concerns.      Darren Valente is a 33 year old, presenting for the following:  Physical        2/21/2024    11:40 AM   Additional Questions   Roomed by Manohar SANTAMARIA        Health Care Directive  Patient does not have a Health Care Directive or Living Will:     HPI        2/21/2024   General Health   How would you rate your overall physical health? Good   Feel stress (tense, anxious, or unable to sleep) Rather much   (!) STRESS CONCERN      2/21/2024   Nutrition   Three or more servings of calcium each day? Yes   Diet: Vegetarian/vegan   How many servings of fruit and " vegetables per day? (!) 2-3   How many sweetened beverages each day? 0-1         2/21/2024   Exercise   Days per week of moderate/strenous exercise 4 days - walks to work         2/21/2024   Social Factors   Frequency of gathering with friends or relatives Twice a week   Worry food won't last until get money to buy more No   Food not last or not have enough money for food? No   Do you have housing?  Yes   Are you worried about losing your housing? No   Lack of transportation? No   Unable to get utilities (heat,electricity)? No         2/21/2024   Dental   Dentist two times every year? (!) NO         2/21/2024   TB Screening   Were you born outside of [place]?  No       Today's PHQ-9 Score:       2/21/2024    11:33 AM   PHQ-9 SCORE   PHQ-9 Total Score MyChart 4 (Minimal depression)   PHQ-9 Total Score 4         2/21/2024   Substance Use   Alcohol more than 3/day or more than 7/wk No   Do you use any other substances recreationally? (!) CANNABIS PRODUCTS     Social History     Tobacco Use    Smoking status: Never    Smokeless tobacco: Never   Vaping Use    Vaping Use: Former    Substances: Nicotine, THC    Devices: Disposable    Passive vaping exposure: Yes   Substance Use Topics    Alcohol use: Yes     Comment: 2-3 per night 3 nights per week at work    Drug use: Yes     Types: Marijuana     Comment: gummies           2/21/2024   STI Screening   New sexual partner(s) since last STI/HIV test? (!) YES - open relationship; hook up last week, but that partner had just had negative STI testing. Stopped taking PrEP five years; two partners in past year         2/21/2024   Contraception/Family Planning   Questions about contraception or family planning No        Reviewed and updated as needed this visit by Provider   Tobacco     Med Hx  Surg Hx  Fam Hx  Soc Hx Sexual Activity          History reviewed. No pertinent past medical history.  History reviewed. No pertinent surgical history.      Review of  "Systems  Constitutional, neuro, ENT, endocrine, pulmonary, cardiac, gastrointestinal, genitourinary, musculoskeletal, integument and psychiatric systems are negative, except as otherwise noted.     Objective    Exam  /82   Pulse (!) 122   Temp 97.8  F (36.6  C) (Temporal)   Resp 22   Ht 1.77 m (5' 9.69\")   Wt 106.1 kg (234 lb)   SpO2 98%   BMI 33.88 kg/m     Estimated body mass index is 33.88 kg/m  as calculated from the following:    Height as of this encounter: 1.77 m (5' 9.69\").    Weight as of this encounter: 106.1 kg (234 lb).    Physical Exam  GENERAL: alert and no distress  EYES: Eyes grossly normal to inspection, PERRL and conjunctivae and sclerae normal  HENT: ear canals and TM's normal, nose and mouth without ulcers or lesions  NECK: no adenopathy, no asymmetry, masses, or scars  RESP: lungs clear to auscultation - no rales, rhonchi or wheezes  CV: regular rate and rhythm, normal S1 S2, no S3 or S4, no murmur, click or rub, no peripheral edema  ABDOMEN: soft, nontender, no hepatosplenomegaly, no masses and bowel sounds normal   (male): normal male genitalia without lesions or urethral discharge, no hernia  MS: no gross musculoskeletal defects noted, no edema  SKIN: no suspicious lesions or rashes  NEURO: Normal strength and tone, mentation intact and speech normal  PSYCH: mentation appears normal, affect normal/bright      Signed Electronically by: JAC Javed CNP    "

## 2024-02-21 NOTE — PATIENT INSTRUCTIONS
"  1.\"Eat food.  Not too much.  Mostly plants\" - Claude Pollen - see link below  - Aim for 5-7 servings of vegetables (raw vegetables - 1 serving = 1/2 cup; raw greens - 1 serving = 1 cup)   - Eat grains, but don't make them the superstar of your meal and DO make them whole grains  2. AVOID sugar.  There is no nutritional benefit to sugar.  3. Drink lots of water (avoid juice and soda)  4. MOVE your body daily - even if some days this means 5 minutes of movement. Walking is great for your bones and brain.  Do aim for 150 minutes per week of activity that raises your heart rate, but \"don't let the ideal get in the way of the good enough\"  5. Get good sleep (6-8 hours/night- less sleep is associated with disease/illness/obesity)   6. Smile and laugh every day - even in the face of tragedy  7. Start a meditation or mindfulness practice    CALCIUM: The average recommended intake for women is 8247-9346 mg calcium daily. It is best to get this from your diet (Milk, yogurt, and cheese, vegetables such as Chinese cabbage, kale, spinach and broccoli). If you are not eating enough calcium, then I recommend Calcium Citrate/vitD supplements daily.     Vitamin D is an essential nutritient that many [place] lack, especially in the winter. It is vital for healthy immune system functioning and can be linked to diseases such as obesity, diabetes, body aches/pain, etc. It is super safe and highly beneficial for a [place] to take a vitamin D3 2000 IU once daily during winter months. Daily vitamin D for life is recommended for anyone who has ever been found deficient.    Other things to consider: do not drive while under the influence of alcohol, do not drive while texting, always wear a seatbelt, wear a helmet when biking, wear sunscreen to help prevent skin cancer, use DEET mosquito spray when outdoors to prevent mosquito and tick bites, & avoid smoking. If you do get bit by a DEER tick, please contact my office immediately to " "consider lyme prophylaxis. Dental visits recommended every 6-12 months.    Claude Barrett's 7 Rules for Eating  [email or web address]/food-recipes/news/28658051/7-rules-for-eating#1    My typical clinic hours are as follows:  Monday 12-5 pm  Tuesday 8am-3pm  Wednesday 7:20am-1pm  Thursday virtual appts 8:20am-12:20 pm  Friday 8-11 am  If you need an appointment urgently and do not find one available on TotSpot or with Central scheduling, please send me a TotSpot message and I will work to get you scheduled with myself or a colleague here     Clinic notes  Lab and imaging results are now available for you to view immediately on completion.  Please know that I often have not seen the result before you see results.  I will interpret and discuss the results and meaning of the results as soon as I am able to review them.   TotSpot is the best way to reach the clinic directly.  When you send a TotSpot message this will be read by our clinic RNs.  Please know that when you call the clinic number, you are not speaking to a staff member from our local clinic.  You can ask that staff to speak to a clinic staff directly if you wish.  You will be able to read my documentation (\"provider notes\") when I finish up and close your chart.  I encourage you to read through to understand your diagnosis and the plan and how we arrived there.  It is also an opportunity to make sure I fully understood your concerns.    "

## 2024-02-22 LAB
ALBUMIN SERPL BCG-MCNC: 4.9 G/DL (ref 3.5–5.2)
ALP SERPL-CCNC: 109 U/L (ref 40–150)
ALT SERPL W P-5'-P-CCNC: 28 U/L (ref 0–70)
ANION GAP SERPL CALCULATED.3IONS-SCNC: 15 MMOL/L (ref 7–15)
AST SERPL W P-5'-P-CCNC: 40 U/L (ref 0–45)
BILIRUB SERPL-MCNC: 1.3 MG/DL
BUN SERPL-MCNC: 12.5 MG/DL (ref 6–20)
CALCIUM SERPL-MCNC: 9.7 MG/DL (ref 8.6–10)
CHLORIDE SERPL-SCNC: 98 MMOL/L (ref 98–107)
CREAT SERPL-MCNC: 0.76 MG/DL (ref 0.67–1.17)
CREAT UR-MCNC: 432 MG/DL
DEPRECATED HCO3 PLAS-SCNC: 27 MMOL/L (ref 22–29)
EGFRCR SERPLBLD CKD-EPI 2021: >90 ML/MIN/1.73M2
GLUCOSE SERPL-MCNC: 103 MG/DL (ref 70–99)
HCV AB SERPL QL IA: NONREACTIVE
MICROALBUMIN UR-MCNC: 70 MG/L
MICROALBUMIN/CREAT UR: 16.2 MG/G CR (ref 0–17)
POTASSIUM SERPL-SCNC: 3.6 MMOL/L (ref 3.4–5.3)
PROT SERPL-MCNC: 8.2 G/DL (ref 6.4–8.3)
SODIUM SERPL-SCNC: 140 MMOL/L (ref 135–145)

## 2024-03-06 NOTE — RESULT ENCOUNTER NOTE
Ambrosio,    Your labs were all normal.  Doesn't look like there are any kidney changes The glucose is fine because you were not fasting at that time.  If you have any questions, please feel free to contact the clinic.    JERSEY Izquierdo

## 2024-05-22 ENCOUNTER — MYC REFILL (OUTPATIENT)
Dept: FAMILY MEDICINE | Facility: CLINIC | Age: 34
End: 2024-05-22
Payer: MEDICAID

## 2024-05-22 DIAGNOSIS — F41.1 GAD (GENERALIZED ANXIETY DISORDER): ICD-10-CM

## 2024-05-22 DIAGNOSIS — F31.81 BIPOLAR 2 DISORDER (H): ICD-10-CM

## 2024-05-22 DIAGNOSIS — F33.1 MODERATE EPISODE OF RECURRENT MAJOR DEPRESSIVE DISORDER (H): ICD-10-CM

## 2024-05-23 RX ORDER — LAMOTRIGINE 200 MG/1
200 TABLET ORAL DAILY
Qty: 90 TABLET | Refills: 1 | OUTPATIENT
Start: 2024-05-23

## 2024-05-29 ENCOUNTER — MYC REFILL (OUTPATIENT)
Dept: FAMILY MEDICINE | Facility: CLINIC | Age: 34
End: 2024-05-29
Payer: MEDICAID

## 2024-05-29 DIAGNOSIS — F31.81 BIPOLAR 2 DISORDER (H): ICD-10-CM

## 2024-05-29 DIAGNOSIS — F41.1 GAD (GENERALIZED ANXIETY DISORDER): ICD-10-CM

## 2024-05-29 DIAGNOSIS — F33.1 MODERATE EPISODE OF RECURRENT MAJOR DEPRESSIVE DISORDER (H): ICD-10-CM

## 2024-05-29 RX ORDER — LAMOTRIGINE 200 MG/1
200 TABLET ORAL DAILY
Qty: 90 TABLET | Refills: 1 | OUTPATIENT
Start: 2024-05-29

## 2024-09-11 ENCOUNTER — MYC REFILL (OUTPATIENT)
Dept: FAMILY MEDICINE | Facility: CLINIC | Age: 34
End: 2024-09-11

## 2024-09-11 DIAGNOSIS — F41.1 GAD (GENERALIZED ANXIETY DISORDER): ICD-10-CM

## 2024-09-11 DIAGNOSIS — F33.1 MODERATE EPISODE OF RECURRENT MAJOR DEPRESSIVE DISORDER (H): ICD-10-CM

## 2024-09-11 DIAGNOSIS — F31.81 BIPOLAR 2 DISORDER (H): ICD-10-CM

## 2024-09-12 RX ORDER — LAMOTRIGINE 200 MG/1
200 TABLET ORAL DAILY
Qty: 90 TABLET | Refills: 1 | Status: SHIPPED | OUTPATIENT
Start: 2024-09-12

## 2025-01-22 ENCOUNTER — PATIENT OUTREACH (OUTPATIENT)
Dept: CARE COORDINATION | Facility: CLINIC | Age: 35
End: 2025-01-22

## 2025-02-05 ENCOUNTER — PATIENT OUTREACH (OUTPATIENT)
Dept: CARE COORDINATION | Facility: CLINIC | Age: 35
End: 2025-02-05

## 2025-03-30 ENCOUNTER — HEALTH MAINTENANCE LETTER (OUTPATIENT)
Age: 35
End: 2025-03-30

## 2025-06-11 ENCOUNTER — MYC REFILL (OUTPATIENT)
Dept: FAMILY MEDICINE | Facility: CLINIC | Age: 35
End: 2025-06-11

## 2025-06-11 DIAGNOSIS — F33.1 MODERATE EPISODE OF RECURRENT MAJOR DEPRESSIVE DISORDER (H): ICD-10-CM

## 2025-06-11 DIAGNOSIS — F41.1 GAD (GENERALIZED ANXIETY DISORDER): ICD-10-CM

## 2025-06-11 DIAGNOSIS — F31.81 BIPOLAR 2 DISORDER (H): ICD-10-CM

## 2025-06-12 RX ORDER — LAMOTRIGINE 200 MG/1
200 TABLET ORAL DAILY
Qty: 30 TABLET | Refills: 0 | Status: SHIPPED | OUTPATIENT
Start: 2025-06-12 | End: 2025-07-17

## 2025-06-12 RX ORDER — LAMOTRIGINE 200 MG/1
200 TABLET ORAL DAILY
Qty: 30 TABLET | Refills: 0 | OUTPATIENT
Start: 2025-06-12

## 2025-06-12 NOTE — TELEPHONE ENCOUNTER
Overdue for preventative appt. Please schedule asap. Refilled lamictal for 30 days  JERSEY Izquierdo

## 2025-07-14 ENCOUNTER — MYC REFILL (OUTPATIENT)
Dept: FAMILY MEDICINE | Facility: CLINIC | Age: 35
End: 2025-07-14

## 2025-07-14 DIAGNOSIS — F31.81 BIPOLAR 2 DISORDER (H): ICD-10-CM

## 2025-07-14 DIAGNOSIS — F33.1 MODERATE EPISODE OF RECURRENT MAJOR DEPRESSIVE DISORDER (H): ICD-10-CM

## 2025-07-14 DIAGNOSIS — F41.1 GAD (GENERALIZED ANXIETY DISORDER): ICD-10-CM

## 2025-07-14 NOTE — TELEPHONE ENCOUNTER
Refilled last month for #30 and patient was sent [employer] message about scheduling preventative. It looks like MyChart wasn't read. Please call patient and get scheduled. I will do dilip refill when scheduled.  JERSEY Izquierdo

## 2025-07-15 RX ORDER — LAMOTRIGINE 200 MG/1
200 TABLET ORAL DAILY
Qty: 30 TABLET | Refills: 0 | Status: CANCELLED | OUTPATIENT
Start: 2025-07-15

## 2025-07-15 NOTE — TELEPHONE ENCOUNTER
Spoke w/ pt - he will sched appt online later when he can look at his schedule, then request bridge refill

## 2025-07-16 ENCOUNTER — MYC MEDICAL ADVICE (OUTPATIENT)
Dept: FAMILY MEDICINE | Facility: CLINIC | Age: 35
End: 2025-07-16

## 2025-07-16 DIAGNOSIS — F31.81 BIPOLAR 2 DISORDER (H): ICD-10-CM

## 2025-07-16 DIAGNOSIS — F41.1 GAD (GENERALIZED ANXIETY DISORDER): ICD-10-CM

## 2025-07-16 DIAGNOSIS — F33.1 MODERATE EPISODE OF RECURRENT MAJOR DEPRESSIVE DISORDER (H): ICD-10-CM

## 2025-07-17 RX ORDER — LAMOTRIGINE 200 MG/1
200 TABLET ORAL DAILY
Qty: 30 TABLET | Refills: 0 | Status: SHIPPED | OUTPATIENT
Start: 2025-07-17